# Patient Record
Sex: MALE | Race: WHITE | NOT HISPANIC OR LATINO | Employment: OTHER | URBAN - METROPOLITAN AREA
[De-identification: names, ages, dates, MRNs, and addresses within clinical notes are randomized per-mention and may not be internally consistent; named-entity substitution may affect disease eponyms.]

---

## 2023-04-25 PROBLEM — H00.019 STYE: Status: ACTIVE | Noted: 2023-04-25

## 2023-04-25 PROBLEM — E55.9 MILD VITAMIN D DEFICIENCY: Status: ACTIVE | Noted: 2023-04-25

## 2023-04-25 PROBLEM — L25.9 CONTACT DERMATITIS: Status: ACTIVE | Noted: 2023-04-25

## 2023-04-25 PROBLEM — K21.9 GERD (GASTROESOPHAGEAL REFLUX DISEASE): Status: ACTIVE | Noted: 2023-04-25

## 2023-04-25 PROBLEM — R05.3 CHRONIC COUGH: Status: ACTIVE | Noted: 2023-04-25

## 2023-04-25 PROBLEM — R21 RASH: Status: ACTIVE | Noted: 2023-04-25

## 2023-04-25 PROBLEM — I10 HYPERTENSION: Status: ACTIVE | Noted: 2023-04-25

## 2023-04-25 PROBLEM — L23.7 ALLERGIC CONTACT DERMATITIS DUE TO PLANTS, EXCEPT FOOD: Status: ACTIVE | Noted: 2023-04-25

## 2023-04-25 PROBLEM — M16.12 ARTHRITIS OF LEFT HIP: Status: ACTIVE | Noted: 2023-04-25

## 2023-04-25 PROBLEM — E78.5 HYPERLIPIDEMIA: Status: ACTIVE | Noted: 2023-04-25

## 2023-04-25 RX ORDER — ASPIRIN 81 MG/1
TABLET ORAL
COMMUNITY
End: 2024-04-10 | Stop reason: ALTCHOICE

## 2023-04-25 RX ORDER — MELOXICAM 15 MG/1
1 TABLET ORAL DAILY
COMMUNITY
End: 2023-04-26 | Stop reason: ALTCHOICE

## 2023-04-25 RX ORDER — MULTIVITAMIN
TABLET ORAL
COMMUNITY
Start: 2021-11-12

## 2023-04-25 RX ORDER — ASCORBIC ACID 500 MG
TABLET,CHEWABLE ORAL
COMMUNITY
Start: 2021-11-12

## 2023-04-25 RX ORDER — AMLODIPINE BESYLATE 5 MG/1
1 TABLET ORAL DAILY
COMMUNITY
Start: 2020-02-03 | End: 2023-04-26 | Stop reason: ALTCHOICE

## 2023-04-25 RX ORDER — ENALAPRIL MALEATE 10 MG/1
10 TABLET ORAL
COMMUNITY
End: 2023-04-26 | Stop reason: ALTCHOICE

## 2023-04-25 RX ORDER — LOVASTATIN 40 MG/1
TABLET ORAL
COMMUNITY
Start: 2017-04-18 | End: 2023-11-20 | Stop reason: SDUPTHER

## 2023-04-26 ENCOUNTER — OFFICE VISIT (OUTPATIENT)
Dept: PRIMARY CARE | Facility: CLINIC | Age: 68
End: 2023-04-26
Payer: MEDICARE

## 2023-04-26 VITALS
SYSTOLIC BLOOD PRESSURE: 150 MMHG | WEIGHT: 269 LBS | HEART RATE: 86 BPM | HEIGHT: 70 IN | RESPIRATION RATE: 14 BRPM | BODY MASS INDEX: 38.51 KG/M2 | DIASTOLIC BLOOD PRESSURE: 84 MMHG | OXYGEN SATURATION: 96 %

## 2023-04-26 DIAGNOSIS — I10 PRIMARY HYPERTENSION: Primary | ICD-10-CM

## 2023-04-26 DIAGNOSIS — E66.09 CLASS 2 OBESITY DUE TO EXCESS CALORIES WITHOUT SERIOUS COMORBIDITY WITH BODY MASS INDEX (BMI) OF 38.0 TO 38.9 IN ADULT: ICD-10-CM

## 2023-04-26 DIAGNOSIS — I12.9 NEPHROPATHY HYPERTENSIVE: ICD-10-CM

## 2023-04-26 PROBLEM — E66.812 CLASS 2 OBESITY DUE TO EXCESS CALORIES WITH BODY MASS INDEX (BMI) OF 38.0 TO 38.9 IN ADULT: Status: ACTIVE | Noted: 2023-04-26

## 2023-04-26 PROCEDURE — 3008F BODY MASS INDEX DOCD: CPT | Performed by: INTERNAL MEDICINE

## 2023-04-26 PROCEDURE — 3077F SYST BP >= 140 MM HG: CPT | Performed by: INTERNAL MEDICINE

## 2023-04-26 PROCEDURE — 1159F MED LIST DOCD IN RCRD: CPT | Performed by: INTERNAL MEDICINE

## 2023-04-26 PROCEDURE — 1036F TOBACCO NON-USER: CPT | Performed by: INTERNAL MEDICINE

## 2023-04-26 PROCEDURE — 1157F ADVNC CARE PLAN IN RCRD: CPT | Performed by: INTERNAL MEDICINE

## 2023-04-26 PROCEDURE — 3079F DIAST BP 80-89 MM HG: CPT | Performed by: INTERNAL MEDICINE

## 2023-04-26 PROCEDURE — 99214 OFFICE O/P EST MOD 30 MIN: CPT | Performed by: INTERNAL MEDICINE

## 2023-04-26 RX ORDER — LISINOPRIL 20 MG/1
20 TABLET ORAL DAILY
Qty: 30 TABLET | Refills: 11 | Status: SHIPPED | OUTPATIENT
Start: 2023-04-26 | End: 2023-05-23

## 2023-04-26 ASSESSMENT — PATIENT HEALTH QUESTIONNAIRE - PHQ9
2. FEELING DOWN, DEPRESSED OR HOPELESS: NOT AT ALL
SUM OF ALL RESPONSES TO PHQ9 QUESTIONS 1 AND 2: 0
1. LITTLE INTEREST OR PLEASURE IN DOING THINGS: NOT AT ALL

## 2023-04-26 ASSESSMENT — ENCOUNTER SYMPTOMS
FEVER: 0
NAUSEA: 0
CONSTIPATION: 0
MYALGIAS: 0
ROS GI COMMENTS: PER HPI
CHILLS: 0
SHORTNESS OF BREATH: 0
COUGH: 0
VOMITING: 0
DIAPHORESIS: 0
JOINT SWELLING: 0
DIARRHEA: 0

## 2023-04-26 NOTE — PROGRESS NOTES
"Subjective   Chris Wen Jr. is a 67 y.o. male who presents for follow up to discuss change in BP med causing protien in urine   New concern rectal stool leakage colonoscopy kian 6/20   Having a hard time loosing wt     HPI   Here for bp med adjustment    HAVING SOME RECTAL SEEPAGE PAST MONTH OR SO    NOT ALWAYS BUT BOTHERSOME    PLANNING COLONOSCOPY 6/20/22    NO PAIN,MAYBE SOME ITCHING    HAS HEMORRHOIDS SINCE OUT OF THE [employer]    OCCASIONALLY SOME MILD BLEEDING    NO WEAKNESS OR NUMBNESS IN SADDLE AREA, NO TRAUMA OR BACK INJURIES.    DIFFICULTY LOSING WEIGHT, DOESN'T WATCH DIET, HAS ONE DRINK NIGHTLY      Review of Systems   Constitutional:  Negative for chills, diaphoresis and fever.   Respiratory:  Negative for cough and shortness of breath.    Cardiovascular:  Negative for chest pain and leg swelling.   Gastrointestinal:  Negative for constipation, diarrhea, nausea and vomiting.        Per hpi     Musculoskeletal:  Negative for joint swelling and myalgias.       Objective   /84   Pulse 86   Resp 14   Ht 1.778 m (5' 10\")   Wt 122 kg (269 lb)   SpO2 96%   BMI 38.60 kg/m²     Physical Exam  Vitals reviewed.   Constitutional:       General: He is not in acute distress.     Appearance: He is obese. He is not ill-appearing.   Cardiovascular:      Rate and Rhythm: Normal rate and regular rhythm.      Pulses: Normal pulses.      Heart sounds:      No gallop.   Pulmonary:      Breath sounds: Normal breath sounds. No wheezing, rhonchi or rales.   Abdominal:      General: Abdomen is flat. Bowel sounds are normal.      Palpations: Abdomen is soft.      Tenderness: There is no guarding or rebound.   Musculoskeletal:      Right lower leg: No edema.      Left lower leg: No edema.         Assessment/Plan   Problem List Items Addressed This Visit          Circulatory    Hypertension - Primary    Relevant Medications    lisinopril (PriniviL) 20 mg tablet    Other Relevant Orders    Basic Metabolic Panel    " Follow Up In [facility] - PCP       Genitourinary    Nephropathy hypertensive       Endocrine/Metabolic    Class 2 obesity due to excess calories with body mass index (BMI) of 38.0 to 38.9 in adult     Patient Instructions    WILL SWITCH YOU TO LISINOPRIL FROM AMLODIPINE FOR PURPOSES OF CONTROLLING THE VERY MINOR PROTEIN IN THE URINE    2.  WILL RECHECK BLOOD TEST FOR KIDNEY FUNCTION A WEEK AFTER YOU HAVE BEEN ON NEW MED    3.  THE RECTAL LEAKAGE  ISSUE IS MOST LIKELY DUE TO HEMORRHOIDS. PLEASE MAKE SURE THE GI DOCTOR KNOWS ABOUT THIS ISSUE BEFORE YOUR COLONOSCOPY SO THAT THEY CAN BE SURE AND LOOK AT SIZE AND SCOPE OF THE HEMORRHOIDS    4.  IF A PROCTO-RECTAL SURGEON IS NEEDED BASED UPON WHAT THE GI DOCTOR SEES, THEN I CAN REFER YOU TO ONE    5.  WEIGHT LOSS MEDS THAT ARE STIMULANT IN NATURE ARE NOT GOOD TO START WHEN CHANGING BP MED, AND NEWER WEIGHT LOSS MEDICATIONS ARE COMING ALONG THAT ARE ACTUALLY QUITE EFFECTIVE.  WILL DISCUSS ONGOING IN FOLLOW UP 3 MONTHS BP CHECK.  WE DISCUSSED MEDITERRANEAN DIET AND ALCOHOL MODERATION TO ASSIST YOU WITH WEIGHT LOSS GOALS

## 2023-04-26 NOTE — PATIENT INSTRUCTIONS
WILL SWITCH YOU TO LISINOPRIL FROM AMLODIPINE FOR PURPOSES OF CONTROLLING THE VERY MINOR PROTEIN IN THE URINE    2.  WILL RECHECK BLOOD TEST FOR KIDNEY FUNCTION A WEEK AFTER YOU HAVE BEEN ON NEW MED    3.  THE RECTAL LEAKAGE  ISSUE IS MOST LIKELY DUE TO HEMORRHOIDS. PLEASE MAKE SURE THE GI DOCTOR KNOWS ABOUT THIS ISSUE BEFORE YOUR COLONOSCOPY SO THAT THEY CAN BE SURE AND LOOK AT SIZE AND SCOPE OF THE HEMORRHOIDS    4.  IF A PROCTO-RECTAL SURGEON IS NEEDED BASED UPON WHAT THE GI DOCTOR SEES, THEN I CAN REFER YOU TO ONE    5.  WEIGHT LOSS MEDS THAT ARE STIMULANT IN NATURE ARE NOT GOOD TO START WHEN CHANGING BP MED, AND NEWER WEIGHT LOSS MEDICATIONS ARE COMING ALONG THAT ARE ACTUALLY QUITE EFFECTIVE.  WILL DISCUSS ONGOING IN FOLLOW UP 3 MONTHS BP CHECK.  WE DISCUSSED MEDITERRANEAN DIET AND ALCOHOL MODERATION TO ASSIST YOU WITH WEIGHT LOSS GOALS

## 2023-05-19 DIAGNOSIS — I10 PRIMARY HYPERTENSION: ICD-10-CM

## 2023-05-22 ENCOUNTER — LAB (OUTPATIENT)
Dept: LAB | Facility: LAB | Age: 68
End: 2023-05-22
Payer: MEDICARE

## 2023-05-22 DIAGNOSIS — I10 PRIMARY HYPERTENSION: ICD-10-CM

## 2023-05-22 LAB
ANION GAP IN SER/PLAS: 16 MMOL/L (ref 10–20)
CALCIUM (MG/DL) IN SER/PLAS: 9 MG/DL (ref 8.6–10.3)
CARBON DIOXIDE, TOTAL (MMOL/L) IN SER/PLAS: 28 MMOL/L (ref 21–32)
CHLORIDE (MMOL/L) IN SER/PLAS: 104 MMOL/L (ref 98–107)
CREATININE (MG/DL) IN SER/PLAS: 1.3 MG/DL (ref 0.5–1.3)
GFR MALE: 59 ML/MIN/1.73M2
GLUCOSE (MG/DL) IN SER/PLAS: 100 MG/DL (ref 74–99)
POTASSIUM (MMOL/L) IN SER/PLAS: 4.9 MMOL/L (ref 3.5–5.3)
SODIUM (MMOL/L) IN SER/PLAS: 143 MMOL/L (ref 136–145)
UREA NITROGEN (MG/DL) IN SER/PLAS: 21 MG/DL (ref 6–23)

## 2023-05-22 PROCEDURE — 80048 BASIC METABOLIC PNL TOTAL CA: CPT

## 2023-05-22 PROCEDURE — 36415 COLL VENOUS BLD VENIPUNCTURE: CPT

## 2023-05-23 DIAGNOSIS — I10 PRIMARY HYPERTENSION: Primary | ICD-10-CM

## 2023-05-23 RX ORDER — LISINOPRIL 20 MG/1
TABLET ORAL
Qty: 30 TABLET | Refills: 11 | Status: SHIPPED | OUTPATIENT
Start: 2023-05-23 | End: 2023-05-24 | Stop reason: SDUPTHER

## 2023-05-24 DIAGNOSIS — I10 PRIMARY HYPERTENSION: ICD-10-CM

## 2023-05-25 RX ORDER — LISINOPRIL 20 MG/1
20 TABLET ORAL DAILY
Qty: 90 TABLET | Refills: 3 | Status: SHIPPED | OUTPATIENT
Start: 2023-05-25 | End: 2023-11-20 | Stop reason: SINTOL

## 2023-06-20 ENCOUNTER — HOSPITAL ENCOUNTER (OUTPATIENT)
Dept: DATA CONVERSION | Facility: HOSPITAL | Age: 68
End: 2023-06-20
Attending: INTERNAL MEDICINE | Admitting: INTERNAL MEDICINE
Payer: MEDICARE

## 2023-06-20 DIAGNOSIS — Z12.11 ENCOUNTER FOR SCREENING FOR MALIGNANT NEOPLASM OF COLON: ICD-10-CM

## 2023-06-20 DIAGNOSIS — I10 ESSENTIAL (PRIMARY) HYPERTENSION: ICD-10-CM

## 2023-06-20 DIAGNOSIS — K57.30 DIVERTICULOSIS OF LARGE INTESTINE WITHOUT PERFORATION OR ABSCESS WITHOUT BLEEDING: ICD-10-CM

## 2023-06-20 DIAGNOSIS — Z86.010 PERSONAL HISTORY OF COLONIC POLYPS: ICD-10-CM

## 2023-06-20 DIAGNOSIS — Z87.891 PERSONAL HISTORY OF NICOTINE DEPENDENCE: ICD-10-CM

## 2023-07-24 ENCOUNTER — APPOINTMENT (OUTPATIENT)
Dept: LAB | Facility: LAB | Age: 68
End: 2023-07-24
Payer: MEDICARE

## 2023-07-24 ENCOUNTER — TELEPHONE (OUTPATIENT)
Dept: PRIMARY CARE | Facility: CLINIC | Age: 68
End: 2023-07-24

## 2023-07-24 ENCOUNTER — LAB (OUTPATIENT)
Dept: LAB | Facility: LAB | Age: 68
End: 2023-07-24
Payer: MEDICARE

## 2023-07-24 DIAGNOSIS — I10 PRIMARY HYPERTENSION: Primary | ICD-10-CM

## 2023-07-24 DIAGNOSIS — I10 PRIMARY HYPERTENSION: ICD-10-CM

## 2023-07-24 LAB
ANION GAP IN SER/PLAS: 16 MMOL/L (ref 10–20)
CALCIUM (MG/DL) IN SER/PLAS: 9.1 MG/DL (ref 8.6–10.3)
CARBON DIOXIDE, TOTAL (MMOL/L) IN SER/PLAS: 22 MMOL/L (ref 21–32)
CHLORIDE (MMOL/L) IN SER/PLAS: 105 MMOL/L (ref 98–107)
CREATININE (MG/DL) IN SER/PLAS: 1.14 MG/DL (ref 0.5–1.3)
GFR MALE: 69 ML/MIN/1.73M2
GLUCOSE (MG/DL) IN SER/PLAS: 101 MG/DL (ref 74–99)
POTASSIUM (MMOL/L) IN SER/PLAS: 4.1 MMOL/L (ref 3.5–5.3)
SODIUM (MMOL/L) IN SER/PLAS: 139 MMOL/L (ref 136–145)
UREA NITROGEN (MG/DL) IN SER/PLAS: 18 MG/DL (ref 6–23)

## 2023-07-24 PROCEDURE — 36415 COLL VENOUS BLD VENIPUNCTURE: CPT

## 2023-07-24 PROCEDURE — 80048 BASIC METABOLIC PNL TOTAL CA: CPT

## 2023-07-24 NOTE — TELEPHONE ENCOUNTER
Pt had BMP done today a month early he says he called last week and was told to do before his appt would be a month sooner then recommended pt doesn't not want to come back next month was not happy with having to be stuck 3x today

## 2023-07-27 ENCOUNTER — OFFICE VISIT (OUTPATIENT)
Dept: PRIMARY CARE | Facility: CLINIC | Age: 68
End: 2023-07-27
Payer: MEDICARE

## 2023-07-27 VITALS
OXYGEN SATURATION: 96 % | RESPIRATION RATE: 14 BRPM | SYSTOLIC BLOOD PRESSURE: 130 MMHG | HEIGHT: 70 IN | DIASTOLIC BLOOD PRESSURE: 80 MMHG | BODY MASS INDEX: 38.37 KG/M2 | HEART RATE: 77 BPM | WEIGHT: 268 LBS

## 2023-07-27 DIAGNOSIS — H90.0 CONDUCTIVE HEARING LOSS, BILATERAL: ICD-10-CM

## 2023-07-27 DIAGNOSIS — I10 PRIMARY HYPERTENSION: ICD-10-CM

## 2023-07-27 DIAGNOSIS — H61.23 CERUMEN DEBRIS ON TYMPANIC MEMBRANE OF BOTH EARS: Primary | ICD-10-CM

## 2023-07-27 PROCEDURE — 99213 OFFICE O/P EST LOW 20 MIN: CPT | Performed by: INTERNAL MEDICINE

## 2023-07-27 PROCEDURE — 1036F TOBACCO NON-USER: CPT | Performed by: INTERNAL MEDICINE

## 2023-07-27 PROCEDURE — 1157F ADVNC CARE PLAN IN RCRD: CPT | Performed by: INTERNAL MEDICINE

## 2023-07-27 PROCEDURE — 3075F SYST BP GE 130 - 139MM HG: CPT | Performed by: INTERNAL MEDICINE

## 2023-07-27 PROCEDURE — 3008F BODY MASS INDEX DOCD: CPT | Performed by: INTERNAL MEDICINE

## 2023-07-27 PROCEDURE — 1159F MED LIST DOCD IN RCRD: CPT | Performed by: INTERNAL MEDICINE

## 2023-07-27 PROCEDURE — 3079F DIAST BP 80-89 MM HG: CPT | Performed by: INTERNAL MEDICINE

## 2023-07-27 ASSESSMENT — PATIENT HEALTH QUESTIONNAIRE - PHQ9
SUM OF ALL RESPONSES TO PHQ9 QUESTIONS 1 AND 2: 0
1. LITTLE INTEREST OR PLEASURE IN DOING THINGS: NOT AT ALL
2. FEELING DOWN, DEPRESSED OR HOPELESS: NOT AT ALL

## 2023-07-27 NOTE — PROGRESS NOTES
"Subjective   Chris Wen Jr. is a 67 y.o. male who presents for FOLLOW UP    C/O INCREASED HEARING LOSS WOULD LIKE REFERRAL   HPI   STARTING TO SENSE A TINNITIS IN LEFT EAR PERHAPS    HEARING GETTING WORSE    NO RECENT HEARING EVALUATION    WAS IN       Review of Systems   Constitutional:  Negative for chills, diaphoresis and fever.   HENT:  Positive for hearing loss.    Respiratory:  Negative for cough and shortness of breath.    Cardiovascular:  Negative for chest pain and leg swelling.   Gastrointestinal:  Negative for constipation, diarrhea, nausea and vomiting.   Musculoskeletal:  Negative for joint swelling and myalgias.       Objective   /80   Pulse 77   Resp 14   Ht 1.778 m (5' 10\")   Wt 122 kg (268 lb)   SpO2 96%   BMI 38.45 kg/m²     Physical Exam  Vitals reviewed.   Constitutional:       General: He is not in acute distress.     Appearance: He is obese. He is not ill-appearing.   HENT:      Right Ear: Tympanic membrane, ear canal and external ear normal. There is no impacted cerumen.      Left Ear: Tympanic membrane, ear canal and external ear normal. There is no impacted cerumen.      Ears:      Comments: Webers lateralize to the right, rinne normal bilat, scant cerumen in external canal  Cardiovascular:      Rate and Rhythm: Normal rate and regular rhythm.      Pulses: Normal pulses.      Heart sounds:      No gallop.   Pulmonary:      Breath sounds: Normal breath sounds. No wheezing, rhonchi or rales.   Abdominal:      General: Abdomen is flat. Bowel sounds are normal.      Palpations: Abdomen is soft.      Tenderness: There is no guarding or rebound.   Musculoskeletal:      Right lower leg: No edema.      Left lower leg: No edema.         Assessment/Plan   Problem List Items Addressed This Visit       Hypertension     Other Visit Diagnoses       Cerumen debris on tympanic membrane of both ears    -  Primary    Relevant Medications    carbamide peroxide (Debrox) 6.5 % otic " solution    Conductive hearing loss, bilateral        Relevant Orders    Referral to Audiology          Patient Instructions    REFERRAL TO AUDIOLOGY FOR HEARING CHECK AND OPINION ON NEED FOR AMLPIFICATION    2,  DEBROX DROPS FOR 4 DAYS THE WEEK PRIOR TO YOUR APPOINTMENT WITH AUDIOLOGY TO CLEAR OUT ANY RESIDUAL EAR WAX    3.  PLEASE CALL IF REFILLS NEEDED.    4.  FOLLOW UP ROUTINELY OR AS NEEDED

## 2023-07-27 NOTE — PATIENT INSTRUCTIONS
REFERRAL TO AUDIOLOGY FOR HEARING CHECK AND OPINION ON NEED FOR AMLPIFICATION    2,  DEBROX DROPS FOR 4 DAYS THE WEEK PRIOR TO YOUR APPOINTMENT WITH AUDIOLOGY TO CLEAR OUT ANY RESIDUAL EAR WAX    3.  PLEASE CALL IF REFILLS NEEDED.    4.  FOLLOW UP ROUTINELY OR AS NEEDED

## 2023-07-31 ASSESSMENT — ENCOUNTER SYMPTOMS
DIARRHEA: 0
CHILLS: 0
SHORTNESS OF BREATH: 0
JOINT SWELLING: 0
COUGH: 0
VOMITING: 0
DIAPHORESIS: 0
MYALGIAS: 0
CONSTIPATION: 0
NAUSEA: 0
FEVER: 0

## 2023-09-07 VITALS — BODY MASS INDEX: 35.41 KG/M2 | HEIGHT: 70 IN | WEIGHT: 247.36 LBS

## 2023-11-20 ENCOUNTER — OFFICE VISIT (OUTPATIENT)
Dept: PRIMARY CARE | Facility: CLINIC | Age: 68
End: 2023-11-20
Payer: MEDICARE

## 2023-11-20 ENCOUNTER — TELEPHONE (OUTPATIENT)
Dept: PRIMARY CARE | Facility: CLINIC | Age: 68
End: 2023-11-20

## 2023-11-20 VITALS
HEART RATE: 87 BPM | BODY MASS INDEX: 40.09 KG/M2 | RESPIRATION RATE: 14 BRPM | DIASTOLIC BLOOD PRESSURE: 92 MMHG | SYSTOLIC BLOOD PRESSURE: 130 MMHG | OXYGEN SATURATION: 96 % | HEIGHT: 70 IN | WEIGHT: 280 LBS

## 2023-11-20 DIAGNOSIS — I10 PRIMARY HYPERTENSION: ICD-10-CM

## 2023-11-20 DIAGNOSIS — Z00.00 MEDICARE ANNUAL WELLNESS VISIT, SUBSEQUENT: Primary | ICD-10-CM

## 2023-11-20 DIAGNOSIS — E55.9 VITAMIN D DEFICIENCY: ICD-10-CM

## 2023-11-20 DIAGNOSIS — E66.01 CLASS 3 SEVERE OBESITY DUE TO EXCESS CALORIES WITHOUT SERIOUS COMORBIDITY WITH BODY MASS INDEX (BMI) OF 40.0 TO 44.9 IN ADULT (MULTI): ICD-10-CM

## 2023-11-20 DIAGNOSIS — E78.5 HYPERLIPIDEMIA, UNSPECIFIED HYPERLIPIDEMIA TYPE: ICD-10-CM

## 2023-11-20 DIAGNOSIS — R35.1 NOCTURIA: ICD-10-CM

## 2023-11-20 DIAGNOSIS — Z00.00 ROUTINE GENERAL MEDICAL EXAMINATION AT HEALTH CARE FACILITY: ICD-10-CM

## 2023-11-20 PROBLEM — E66.813 CLASS 3 SEVERE OBESITY WITH BODY MASS INDEX (BMI) OF 40.0 TO 44.9 IN ADULT: Status: ACTIVE | Noted: 2023-11-20

## 2023-11-20 PROCEDURE — 3008F BODY MASS INDEX DOCD: CPT | Performed by: INTERNAL MEDICINE

## 2023-11-20 PROCEDURE — 1170F FXNL STATUS ASSESSED: CPT | Performed by: INTERNAL MEDICINE

## 2023-11-20 PROCEDURE — 3075F SYST BP GE 130 - 139MM HG: CPT | Performed by: INTERNAL MEDICINE

## 2023-11-20 PROCEDURE — 99212 OFFICE O/P EST SF 10 MIN: CPT | Performed by: INTERNAL MEDICINE

## 2023-11-20 PROCEDURE — 1159F MED LIST DOCD IN RCRD: CPT | Performed by: INTERNAL MEDICINE

## 2023-11-20 PROCEDURE — G0439 PPPS, SUBSEQ VISIT: HCPCS | Performed by: INTERNAL MEDICINE

## 2023-11-20 PROCEDURE — 1036F TOBACCO NON-USER: CPT | Performed by: INTERNAL MEDICINE

## 2023-11-20 PROCEDURE — 3080F DIAST BP >= 90 MM HG: CPT | Performed by: INTERNAL MEDICINE

## 2023-11-20 RX ORDER — LOVASTATIN 40 MG/1
40 TABLET ORAL
Qty: 90 TABLET | Refills: 3 | Status: SHIPPED | OUTPATIENT
Start: 2023-11-20 | End: 2024-04-10 | Stop reason: ALTCHOICE

## 2023-11-20 RX ORDER — LOSARTAN POTASSIUM 50 MG/1
50 TABLET ORAL DAILY
Qty: 90 TABLET | Refills: 3 | Status: SHIPPED | OUTPATIENT
Start: 2023-11-20 | End: 2023-12-11 | Stop reason: SDUPTHER

## 2023-11-20 ASSESSMENT — ACTIVITIES OF DAILY LIVING (ADL)
DRESSING: INDEPENDENT
MANAGING_FINANCES: INDEPENDENT
GROCERY_SHOPPING: INDEPENDENT
DOING_HOUSEWORK: INDEPENDENT
TAKING_MEDICATION: INDEPENDENT
BATHING: INDEPENDENT

## 2023-11-20 ASSESSMENT — ENCOUNTER SYMPTOMS
MYALGIAS: 0
CONSTIPATION: 0
FEVER: 0
SHORTNESS OF BREATH: 0
NAUSEA: 0
COUGH: 0
DIAPHORESIS: 0
JOINT SWELLING: 0
DIARRHEA: 0
CHILLS: 0
VOMITING: 0

## 2023-11-20 NOTE — PATIENT INSTRUCTIONS
RECOMMEND ONGOING REGULAR EXERCISE, AND CARBOHYDRATE RESTRICTION TO HELP LOSE WEIGHT    2.  FASTING LABS ARE ORDERED FOR AFTER 12/29/2023    3.  PLEASE VERIFY WITH YOUR DAUGHTER THAT YOU HAVE RECEIVED THE PNEUMONIA IMMUNIZATION    4.  REGULAR EYE EXAMS YEARLY FOR RETINA AND GLAUCOMA CHECK    5.  PLEASE CALL IF REFILLS ARE NEEDED    6.  CHANGE LISINOPRIL TO LOSARTAN 50 MG DAILY IN AN EFFORT TO MITIGATE CHRONIC COUGH    7.  YEARLY FOLLOW UP OR AS NEEDED    8.  PLEASE KEEP EYE ON BP AFTER MED CHANGE AND CALL.COME IN IF SUSTAINED >140/90    9.  WE DISCUSSED THAT WHITE NOISE CAN HELP RELIEVE SOME OF THE IRRITATION OF TINNITIS

## 2023-11-20 NOTE — TELEPHONE ENCOUNTER
JERICHO QUINONEZ,  I FORGOT TO DISCUSS SNORING WITH MR. FENG  IF HIS SPOUSE SAYS HE STOPS BREATHING OR IS GASPING, AND IF HE'S CHRONICALLY FATIGUED, THEN I COULD JUSTIFY CHECKING A HOME SLEEP TEST TO EXCLUDE SLEEP APNEA, THX, JUST LET ME KNOW

## 2023-11-20 NOTE — PROGRESS NOTES
"Subjective   Reason for Visit: Chris Wen Jr. is an 68 y.o. male here for a Medicare Wellness visit.    DEFERS FLU SHOT    RF NEEDED ON LOVASTATIN   PICKING UP HEARING AIDES TODAY    HAS HAD A COUGH X 5 MONTHS   ALSO HAS WHEEZING HAS GAINED WT   PT SNORES   PT SAYS HIS LAST HIP WAS DONE 4YRS AGO CALLED FOR ATB WAS TOLD GUIDE LINES CHANGED ONLY NEEDED FOR A YR PT WANTS YOUR OPINION IF HE SHOULD STILL TAKE.     Past Medical, Surgical, and Family History reviewed and updated in chart.    Reviewed all medications by prescribing practitioner or clinical pharmacist (such as prescriptions, OTCs, herbal therapies and supplements) and documented in the medical record.    HPI  GOT PNEUMONIA SHOT 2 YEARS AGO.    GOING TO GET FLU SHOT AT DAUGHTER'S PHARMACY    NEVER SMOKER    PLAY TENNIS, PICKLE BALL TWICE PER WEEK, GOT OUT OF WALKING HABIT    RETIRED  AND [employer]  SAW ENT FOR LEFT TINNITIS, NO CAUSE FOUND, HEATING AIDS PRESCRIBED    TINNITIS ANNOYING WHEN READING OR QUIET          Patient Care Team:  Janak Nelson MD as PCP - General (Internal Medicine)  Janak Nelson MD as PCP - tna Medicare Advantage PCP     Review of Systems   Constitutional:  Negative for chills, diaphoresis and fever.   HENT:  Positive for hearing loss and tinnitus.    Respiratory:  Negative for cough and shortness of breath.    Cardiovascular:  Negative for chest pain and leg swelling.   Gastrointestinal:  Negative for constipation, diarrhea, nausea and vomiting.   Musculoskeletal:  Negative for joint swelling and myalgias.       Objective   Vitals:  BP (!) 130/92   Pulse 87   Resp 14   Ht 1.778 m (5' 10\")   Wt 127 kg (280 lb)   SpO2 96%   BMI 40.18 kg/m²       Physical Exam  Vitals reviewed.   Constitutional:       General: He is not in acute distress.     Appearance: He is obese. He is not ill-appearing.   HENT:      Right Ear: Tympanic membrane, ear canal and external ear normal. There is no impacted " cerumen.      Left Ear: Tympanic membrane, ear canal and external ear normal. There is no impacted cerumen.      Ears:      Comments: scant cerumen in external canal  Cardiovascular:      Rate and Rhythm: Normal rate and regular rhythm.      Pulses: Normal pulses.      Heart sounds:      No gallop.   Pulmonary:      Breath sounds: Normal breath sounds. No wheezing, rhonchi or rales.   Abdominal:      General: Abdomen is flat. Bowel sounds are normal.      Palpations: Abdomen is soft.      Tenderness: There is no guarding or rebound.   Musculoskeletal:      Right lower leg: No edema.      Left lower leg: No edema.         Assessment/Plan   Problem List Items Addressed This Visit       Hyperlipidemia    Relevant Medications    lovastatin (Mevacor) 40 mg tablet    Other Relevant Orders    Vitamin B12    Lipid Panel    TSH with reflex to Free T4 if abnormal    Comprehensive Metabolic Panel    CBC    Hypertension    Relevant Medications    losartan (Cozaar) 50 mg tablet    Other Relevant Orders    Protein, Urine Random    Medicare annual wellness visit, subsequent - Primary    Class 3 severe obesity with body mass index (BMI) of 40.0 to 44.9 in adult (CMS/[facility])     Other Visit Diagnoses       Nocturia        Relevant Orders    Prostate Specific Antigen, Screen    Vitamin D deficiency        Relevant Orders    Vitamin D 25-Hydroxy,Total (for eval of Vitamin D levels)    Routine general medical examination at health care facility              Patient Instructions    RECOMMEND ONGOING REGULAR EXERCISE, AND CARBOHYDRATE RESTRICTION TO HELP LOSE WEIGHT    2.  FASTING LABS ARE ORDERED FOR AFTER 12/29/2023    3.  PLEASE VERIFY WITH YOUR DAUGHTER THAT YOU HAVE RECEIVED THE PNEUMONIA IMMUNIZATION    4.  REGULAR EYE EXAMS YEARLY FOR RETINA AND GLAUCOMA CHECK    5.  PLEASE CALL IF REFILLS ARE NEEDED    6.  CHANGE LISINOPRIL TO LOSARTAN 50 MG DAILY IN AN EFFORT TO MITIGATE CHRONIC COUGH    7.  YEARLY FOLLOW UP OR AS NEEDED    8.   PLEASE KEEP EYE ON BP AFTER MED CHANGE AND CALL.COME IN IF SUSTAINED >140/90    9.  WE DISCUSSED THAT WHITE NOISE CAN HELP RELIEVE SOME OF THE IRRITATION OF TINNITIS

## 2023-12-11 ENCOUNTER — TELEPHONE (OUTPATIENT)
Dept: PRIMARY CARE | Facility: CLINIC | Age: 68
End: 2023-12-11
Payer: MEDICARE

## 2023-12-11 DIAGNOSIS — I10 PRIMARY HYPERTENSION: ICD-10-CM

## 2023-12-11 RX ORDER — LOSARTAN POTASSIUM 100 MG/1
100 TABLET ORAL DAILY
Qty: 90 TABLET | Refills: 3 | Status: SHIPPED | OUTPATIENT
Start: 2023-12-11 | End: 2024-12-10

## 2023-12-11 NOTE — TELEPHONE ENCOUNTER
Pt wanted you to know that since you switched BP meds from Lisinipril to Losartan he has been monitoring his BP.  It has been an average of 158/95.  This morning it was 172/105.    He wanted you to be aware and if you want to make any adjustments?    Please advise.

## 2023-12-26 ENCOUNTER — PHARMACY VISIT (OUTPATIENT)
Dept: PHARMACY | Facility: CLINIC | Age: 68
End: 2023-12-26

## 2023-12-26 ENCOUNTER — TELEPHONE (OUTPATIENT)
Dept: PRIMARY CARE | Facility: CLINIC | Age: 68
End: 2023-12-26
Payer: MEDICARE

## 2023-12-26 DIAGNOSIS — U07.1 COVID-19 VIRUS INFECTION: Primary | ICD-10-CM

## 2023-12-26 PROCEDURE — RXMED WILLOW AMBULATORY MEDICATION CHARGE

## 2023-12-26 NOTE — TELEPHONE ENCOUNTER
Pt was diagnosed Covid Positive on 12/25 and is requesting Paxlovid Rx to be sent to [facility] in [place].

## 2024-03-11 ENCOUNTER — LAB (OUTPATIENT)
Dept: LAB | Facility: LAB | Age: 69
End: 2024-03-11
Payer: MEDICARE

## 2024-03-11 DIAGNOSIS — I10 PRIMARY HYPERTENSION: ICD-10-CM

## 2024-03-11 DIAGNOSIS — R35.1 NOCTURIA: ICD-10-CM

## 2024-03-11 DIAGNOSIS — E55.9 VITAMIN D DEFICIENCY: ICD-10-CM

## 2024-03-11 DIAGNOSIS — E78.5 HYPERLIPIDEMIA, UNSPECIFIED HYPERLIPIDEMIA TYPE: ICD-10-CM

## 2024-03-11 LAB
25(OH)D3 SERPL-MCNC: 26 NG/ML (ref 30–100)
ALBUMIN SERPL BCP-MCNC: 4 G/DL (ref 3.4–5)
ALP SERPL-CCNC: 43 U/L (ref 33–136)
ALT SERPL W P-5'-P-CCNC: 50 U/L (ref 10–52)
ANION GAP SERPL CALC-SCNC: 10 MMOL/L (ref 10–20)
AST SERPL W P-5'-P-CCNC: 42 U/L (ref 9–39)
BILIRUB SERPL-MCNC: 0.4 MG/DL (ref 0–1.2)
BUN SERPL-MCNC: 18 MG/DL (ref 6–23)
CALCIUM SERPL-MCNC: 8.5 MG/DL (ref 8.6–10.3)
CHLORIDE SERPL-SCNC: 107 MMOL/L (ref 98–107)
CHOLEST SERPL-MCNC: 152 MG/DL (ref 0–199)
CHOLESTEROL/HDL RATIO: 3.4
CO2 SERPL-SCNC: 28 MMOL/L (ref 21–32)
CREAT SERPL-MCNC: 1 MG/DL (ref 0.5–1.3)
CREAT UR-MCNC: 149.9 MG/DL (ref 20–370)
EGFRCR SERPLBLD CKD-EPI 2021: 81 ML/MIN/1.73M*2
ERYTHROCYTE [DISTWIDTH] IN BLOOD BY AUTOMATED COUNT: 14.3 % (ref 11.5–14.5)
GLUCOSE SERPL-MCNC: 88 MG/DL (ref 74–99)
HCT VFR BLD AUTO: 43.9 % (ref 41–52)
HDLC SERPL-MCNC: 44.9 MG/DL
HGB BLD-MCNC: 14.2 G/DL (ref 13.5–17.5)
LDLC SERPL CALC-MCNC: 62 MG/DL
MCH RBC QN AUTO: 29.9 PG (ref 26–34)
MCHC RBC AUTO-ENTMCNC: 32.3 G/DL (ref 32–36)
MCV RBC AUTO: 92 FL (ref 80–100)
NON HDL CHOLESTEROL: 107 MG/DL (ref 0–149)
NRBC BLD-RTO: 0 /100 WBCS (ref 0–0)
PLATELET # BLD AUTO: 214 X10*3/UL (ref 150–450)
POTASSIUM SERPL-SCNC: 4.4 MMOL/L (ref 3.5–5.3)
PROT SERPL-MCNC: 6.3 G/DL (ref 6.4–8.2)
PROT UR-ACNC: 12 MG/DL (ref 5–25)
PROT/CREAT UR: 0.08 MG/MG CREAT (ref 0–0.17)
PSA SERPL-MCNC: 0.37 NG/ML
RBC # BLD AUTO: 4.75 X10*6/UL (ref 4.5–5.9)
SODIUM SERPL-SCNC: 141 MMOL/L (ref 136–145)
TRIGL SERPL-MCNC: 228 MG/DL (ref 0–149)
TSH SERPL-ACNC: 3.04 MIU/L (ref 0.44–3.98)
VIT B12 SERPL-MCNC: 380 PG/ML (ref 211–911)
VLDL: 46 MG/DL (ref 0–40)
WBC # BLD AUTO: 7 X10*3/UL (ref 4.4–11.3)

## 2024-03-11 PROCEDURE — 80061 LIPID PANEL: CPT

## 2024-03-11 PROCEDURE — 84153 ASSAY OF PSA TOTAL: CPT

## 2024-03-11 PROCEDURE — 85027 COMPLETE CBC AUTOMATED: CPT

## 2024-03-11 PROCEDURE — 82570 ASSAY OF URINE CREATININE: CPT

## 2024-03-11 PROCEDURE — 80053 COMPREHEN METABOLIC PANEL: CPT

## 2024-03-11 PROCEDURE — 84156 ASSAY OF PROTEIN URINE: CPT

## 2024-03-11 PROCEDURE — 36415 COLL VENOUS BLD VENIPUNCTURE: CPT

## 2024-03-11 PROCEDURE — 84443 ASSAY THYROID STIM HORMONE: CPT

## 2024-03-11 PROCEDURE — 82607 VITAMIN B-12: CPT

## 2024-03-11 PROCEDURE — 82306 VITAMIN D 25 HYDROXY: CPT

## 2024-04-10 ENCOUNTER — OFFICE VISIT (OUTPATIENT)
Dept: PRIMARY CARE | Facility: CLINIC | Age: 69
End: 2024-04-10
Payer: MEDICARE

## 2024-04-10 VITALS
OXYGEN SATURATION: 95 % | RESPIRATION RATE: 14 BRPM | WEIGHT: 271 LBS | HEART RATE: 82 BPM | DIASTOLIC BLOOD PRESSURE: 90 MMHG | SYSTOLIC BLOOD PRESSURE: 132 MMHG | BODY MASS INDEX: 38.8 KG/M2 | HEIGHT: 70 IN

## 2024-04-10 DIAGNOSIS — E66.01 CLASS 3 SEVERE OBESITY DUE TO EXCESS CALORIES WITHOUT SERIOUS COMORBIDITY WITH BODY MASS INDEX (BMI) OF 40.0 TO 44.9 IN ADULT (MULTI): ICD-10-CM

## 2024-04-10 DIAGNOSIS — I10 PRIMARY HYPERTENSION: ICD-10-CM

## 2024-04-10 DIAGNOSIS — R06.09 DYSPNEA ON EXERTION: Primary | ICD-10-CM

## 2024-04-10 DIAGNOSIS — E78.5 HYPERLIPIDEMIA, UNSPECIFIED HYPERLIPIDEMIA TYPE: ICD-10-CM

## 2024-04-10 PROCEDURE — 99214 OFFICE O/P EST MOD 30 MIN: CPT | Performed by: INTERNAL MEDICINE

## 2024-04-10 PROCEDURE — 3008F BODY MASS INDEX DOCD: CPT | Performed by: INTERNAL MEDICINE

## 2024-04-10 PROCEDURE — 3075F SYST BP GE 130 - 139MM HG: CPT | Performed by: INTERNAL MEDICINE

## 2024-04-10 PROCEDURE — 1157F ADVNC CARE PLAN IN RCRD: CPT | Performed by: INTERNAL MEDICINE

## 2024-04-10 PROCEDURE — 3080F DIAST BP >= 90 MM HG: CPT | Performed by: INTERNAL MEDICINE

## 2024-04-10 PROCEDURE — 1159F MED LIST DOCD IN RCRD: CPT | Performed by: INTERNAL MEDICINE

## 2024-04-10 PROCEDURE — 93000 ELECTROCARDIOGRAM COMPLETE: CPT | Performed by: INTERNAL MEDICINE

## 2024-04-10 PROCEDURE — 1036F TOBACCO NON-USER: CPT | Performed by: INTERNAL MEDICINE

## 2024-04-10 RX ORDER — LOVASTATIN 40 MG/1
40 TABLET ORAL
Start: 2024-04-10 | End: 2025-04-10

## 2024-04-10 ASSESSMENT — ENCOUNTER SYMPTOMS
CHEST TIGHTNESS: 0
JOINT SWELLING: 0
FATIGUE: 1
SHORTNESS OF BREATH: 1
DIAPHORESIS: 0
WHEEZING: 1
FEVER: 0
COUGH: 0
PALPITATIONS: 0
VOMITING: 0
DIARRHEA: 0
CONSTIPATION: 0
CHILLS: 0
NAUSEA: 0
MYALGIAS: 0

## 2024-04-10 NOTE — PROGRESS NOTES
"Subjective   Chris Wen Jr. is a 68 y.o. male who presents for INCREASED FATIGUE SLEEPING A LOT       HPI   SLEEP WELL    CAN SLEEP FOR 7.5 HOURS, THEN 3 HOURS LATER FEEL SLEEPY AND CAN FALL ASLEEP    THIS HAS BEEN CHRONIC    WHAT IS NEW IS THAT GOING UP AND DOWN STAIRS GETTING A LITTLE SHORT OF BREATH AND LIGHT HEADED.  NOT EVERY TIME.  ONLY GOING UPSTAIRS, NOT SO MUCH DOWN STAIRS.    HAVEN'T FALLEN DOWN,     PLAY PICKLE BALL AND TENNIS.  WALK ALOT.  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  HAS LOST 9 POUNDS FROM ALL THE WALKING.    WIFE IS RESP THERAPIST.  DO NATURE HHIKES IN THE COMMUNITY AND WALK GREAT [employer].    NOT HAD CHEST PAIN.  WHEN GETS SOB, NOT GETTING SUPER SWEATY OR NAUSEATED    WHEN SLEEP DON'T SNORE, DON'T TOSS AND TURN, SO NO APNEA PER SPOUSE RESP THERAPIST.    DOES WHEEZE A LOT WHEN SHORT OF BREATH.  BUT CAN ALSO WHEEZE WHEN SITTING WATCHING TV.  Review of Systems   Constitutional:  Positive for fatigue. Negative for chills, diaphoresis and fever.   Respiratory:  Positive for shortness of breath and wheezing. Negative for cough and chest tightness.    Cardiovascular:  Negative for chest pain, palpitations and leg swelling.   Gastrointestinal:  Negative for constipation, diarrhea, nausea and vomiting.   Musculoskeletal:  Negative for joint swelling and myalgias.       Objective   /90   Pulse 82   Resp 14   Ht 1.778 m (5' 10\")   Wt 123 kg (271 lb)   SpO2 95%   BMI 38.88 kg/m²     Physical Exam  Vitals reviewed.   Constitutional:       General: He is not in acute distress.     Appearance: He is obese. He is not ill-appearing.   HENT:      Right Ear: Tympanic membrane, ear canal and external ear normal. There is no impacted cerumen.      Left Ear: Tympanic membrane, ear canal and external ear normal. There is no impacted cerumen.   Cardiovascular:      Rate and Rhythm: Normal rate and regular rhythm.      Pulses: Normal pulses.      Heart sounds:      No " gallop.   Pulmonary:      Breath sounds: Normal breath sounds. No wheezing, rhonchi or rales.   Abdominal:      General: Abdomen is flat. Bowel sounds are normal.      Palpations: Abdomen is soft.      Tenderness: There is no guarding or rebound.   Musculoskeletal:      Right lower leg: No edema.      Left lower leg: No edema.         Assessment/Plan   Problem List Items Addressed This Visit       Hyperlipidemia    Relevant Medications    lovastatin (Mevacor) 40 mg tablet    Other Relevant Orders    Referral to Cardiology    Hypertension    Relevant Orders    Referral to Cardiology    Class 3 severe obesity with body mass index (BMI) of 40.0 to 44.9 in adult (CMS/[facility])     WEIGHT LOSS ENCOURAGED         Dyspnea on exertion - Primary    Relevant Orders    ECG 12 lead (Clinic Performed)    Referral to Cardiology     Patient Instructions    IN THE CASE OF NEW ONSET CONSISTENT SHORTNESS OF BREATH WITH EXERTION ASSOCIATED WITH FATIGUE IN A MAN YOUR AGE WITH HYPERTENSION AND CHOLESTEROL, IT WOULD BE BEST TO HAVE YOU SEE A CARDIOLOGIST TO EXCLUDE ANY POTENTIAL CARDIAC CONTRIBUTION TO THE SYMPTOM.    2.  IN THE MEANTIME, CONTINUE CURRENT MEDS AND ADD BACK BABY ASPIRIN A DAY UNTIL THE SITUATION BECOMES CLARIFIED BY THE CARDIOLOGIST.    3.  IF HEART TESTS OUT TO BE FINE, THEN I WILL WORK ON THE NEXT MOST LIKELY CAUSE OF YOUR SYMPTOM WHICH WOULD BE PULMONARY - SUCH AS A NEW ASTHMATIC SITUATION PERHAPS.      4.  IN THE MEANTIME UNTIL YOU SEE CARDIOLOGY,  IF YOU SHOULD EXPERIENCE CHEST DISCOMFORT OR SHORTNESS OF BREATH THAT DOES NOT RESOLVE WITH REST, THEN YOU SHOULD BE SEEN URGENTLY IN THE ER TO EXCLUDE CARDIAC EVENT    5.  FOLLOW UP AS NEEDED AFTER CARDIOLOGY.    6.  EKG HERE IS UNREMARKABLE AND UNCHANGED FROM THE ONE DONE 3 YEARS AGO.  THIS IS REASSURING BUT STILL REQUIRES CARDIAC TESTING TO EXCLUDE ANY POTENTIAL ISSUES.

## 2024-04-10 NOTE — PATIENT INSTRUCTIONS
IN THE CASE OF NEW ONSET CONSISTENT SHORTNESS OF BREATH WITH EXERTION ASSOCIATED WITH FATIGUE IN A MAN YOUR AGE WITH HYPERTENSION AND CHOLESTEROL, IT WOULD BE BEST TO HAVE YOU SEE A CARDIOLOGIST TO EXCLUDE ANY POTENTIAL CARDIAC CONTRIBUTION TO THE SYMPTOM.    2.  IN THE MEANTIME, CONTINUE CURRENT MEDS AND ADD BACK BABY ASPIRIN A DAY UNTIL THE SITUATION BECOMES CLARIFIED BY THE CARDIOLOGIST.    3.  IF HEART TESTS OUT TO BE FINE, THEN I WILL WORK ON THE NEXT MOST LIKELY CAUSE OF YOUR SYMPTOM WHICH WOULD BE PULMONARY - SUCH AS A NEW ASTHMATIC SITUATION PERHAPS.      4.  IN THE MEANTIME UNTIL YOU SEE CARDIOLOGY,  IF YOU SHOULD EXPERIENCE CHEST DISCOMFORT OR SHORTNESS OF BREATH THAT DOES NOT RESOLVE WITH REST, THEN YOU SHOULD BE SEEN URGENTLY IN THE ER TO EXCLUDE CARDIAC EVENT    5.  FOLLOW UP AS NEEDED AFTER CARDIOLOGY.    6.  EKG HERE IS UNREMARKABLE AND UNCHANGED FROM THE ONE DONE 3 YEARS AGO.  THIS IS REASSURING BUT STILL REQUIRES CARDIAC TESTING TO EXCLUDE ANY POTENTIAL ISSUES.

## 2024-04-18 ENCOUNTER — TELEPHONE (OUTPATIENT)
Dept: PRIMARY CARE | Facility: CLINIC | Age: 69
End: 2024-04-18
Payer: MEDICARE

## 2024-04-18 NOTE — TELEPHONE ENCOUNTER
GRACE.....Patient wanted to report that he started taking the low dose asprin as you suggested, 2 weeks ago, and already sees a change.  He is not as tired and not sleeping as much.    He is going to still have the stress test done which is scheduled 5/3/24.

## 2024-04-26 PROBLEM — Z86.0100 HISTORY OF COLONIC POLYPS: Status: ACTIVE | Noted: 2023-06-20

## 2024-04-26 PROBLEM — F19.21 HISTORY OF SUBSTANCE DEPENDENCE (MULTI): Status: ACTIVE | Noted: 2023-06-20

## 2024-04-26 PROBLEM — Z86.010 HISTORY OF COLONIC POLYPS: Status: ACTIVE | Noted: 2023-06-20

## 2024-04-26 PROBLEM — H90.0 CONDUCTIVE HEARING LOSS, BILATERAL: Status: ACTIVE | Noted: 2024-04-26

## 2024-04-26 PROBLEM — K57.30 DIVERTICULOSIS OF LARGE INTESTINE: Status: ACTIVE | Noted: 2023-06-20

## 2024-04-26 PROBLEM — M71.9 DISORDER OF BURSAE OF SHOULDER REGION: Status: ACTIVE | Noted: 2021-05-25

## 2024-04-26 PROBLEM — Z98.890 HISTORY OF REPAIR OF HIP JOINT: Status: ACTIVE | Noted: 2024-04-26

## 2024-04-26 RX ORDER — OMEPRAZOLE 40 MG/1
40 CAPSULE, DELAYED RELEASE ORAL DAILY
COMMUNITY
Start: 2020-03-10 | End: 2024-05-02 | Stop reason: WASHOUT

## 2024-05-02 ENCOUNTER — OFFICE VISIT (OUTPATIENT)
Dept: CARDIOLOGY | Facility: CLINIC | Age: 69
End: 2024-05-02
Payer: MEDICARE

## 2024-05-02 VITALS
OXYGEN SATURATION: 96 % | SYSTOLIC BLOOD PRESSURE: 131 MMHG | DIASTOLIC BLOOD PRESSURE: 81 MMHG | WEIGHT: 272 LBS | HEIGHT: 70 IN | HEART RATE: 88 BPM | BODY MASS INDEX: 38.94 KG/M2

## 2024-05-02 DIAGNOSIS — E78.2 MIXED HYPERLIPIDEMIA: ICD-10-CM

## 2024-05-02 DIAGNOSIS — M19.90 OSTEOARTHRITIS, UNSPECIFIED OSTEOARTHRITIS TYPE, UNSPECIFIED SITE: ICD-10-CM

## 2024-05-02 DIAGNOSIS — E66.09 CLASS 2 OBESITY DUE TO EXCESS CALORIES WITHOUT SERIOUS COMORBIDITY WITH BODY MASS INDEX (BMI) OF 38.0 TO 38.9 IN ADULT: Primary | ICD-10-CM

## 2024-05-02 DIAGNOSIS — R94.31 ABNORMAL EKG: ICD-10-CM

## 2024-05-02 DIAGNOSIS — I10 PRIMARY HYPERTENSION: ICD-10-CM

## 2024-05-02 DIAGNOSIS — R06.09 DYSPNEA ON EXERTION: ICD-10-CM

## 2024-05-02 DIAGNOSIS — R53.83 FATIGUE, UNSPECIFIED TYPE: ICD-10-CM

## 2024-05-02 DIAGNOSIS — R06.83 SNORING: ICD-10-CM

## 2024-05-02 DIAGNOSIS — E55.9 VITAMIN D DEFICIENCY: ICD-10-CM

## 2024-05-02 DIAGNOSIS — E78.5 HYPERLIPIDEMIA, UNSPECIFIED HYPERLIPIDEMIA TYPE: ICD-10-CM

## 2024-05-02 DIAGNOSIS — Z82.49 FAMILY HISTORY OF CARDIAC PACEMAKER: ICD-10-CM

## 2024-05-02 DIAGNOSIS — Z96.643 STATUS POST BILATERAL HIP REPLACEMENTS: ICD-10-CM

## 2024-05-02 PROCEDURE — 93000 ELECTROCARDIOGRAM COMPLETE: CPT | Performed by: INTERNAL MEDICINE

## 2024-05-02 PROCEDURE — 1157F ADVNC CARE PLAN IN RCRD: CPT | Performed by: INTERNAL MEDICINE

## 2024-05-02 PROCEDURE — 99205 OFFICE O/P NEW HI 60 MIN: CPT | Performed by: INTERNAL MEDICINE

## 2024-05-02 PROCEDURE — 3008F BODY MASS INDEX DOCD: CPT | Performed by: INTERNAL MEDICINE

## 2024-05-02 PROCEDURE — 1036F TOBACCO NON-USER: CPT | Performed by: INTERNAL MEDICINE

## 2024-05-02 PROCEDURE — 3075F SYST BP GE 130 - 139MM HG: CPT | Performed by: INTERNAL MEDICINE

## 2024-05-02 PROCEDURE — 3079F DIAST BP 80-89 MM HG: CPT | Performed by: INTERNAL MEDICINE

## 2024-05-02 PROCEDURE — 1126F AMNT PAIN NOTED NONE PRSNT: CPT | Performed by: INTERNAL MEDICINE

## 2024-05-02 PROCEDURE — 1159F MED LIST DOCD IN RCRD: CPT | Performed by: INTERNAL MEDICINE

## 2024-05-02 RX ORDER — ASPIRIN 81 MG/1
81 TABLET ORAL DAILY
COMMUNITY

## 2024-05-02 ASSESSMENT — PAIN SCALES - GENERAL: PAINLEVEL: 0-NO PAIN

## 2024-05-02 NOTE — PROGRESS NOTES
CARDIOLOGY CONSULTATION NOTE       Patient:    Chris Wen Jr.    YOB: 1955  MRN:    [id number]    Date:   5/2/2024    Primary Physician: Janak Nelson MD    Reason for Visit: Initial cardiology consultation for fatigue and dyspnea on exertion     IMPRESSION:      Fatigue  Dyspnea on exertion  Lightheadedness  Snoring, witnessed  Hypertension  Hyperlipidemia  Abnormal electrocardiogram  GERD, reported but patient denies  Degenerative joint disease, post prior bilateral total hip replacement  Obesity  Vitamin D deficiency  Family history of pacemaker  Otherwise as per assessment below.    RECOMMENDATIONS:      The patient has above-noted history and findings.  He has fatigue and dyspnea on exertion predominantly since October 2024.  He has not had prior cardiovascular testing.  He does have risk factors and would suggest further cardiovascular evaluation to exclude underlying noncardiac causes for his symptomatology.  Would suggest the following: CT calcium score, echocardiogram, exercise Myoview perfusion study.  From a pulmonary standpoint would suggest pulmonary function studies as well as chest x-ray PA and left lateral.  We did discuss the possibility of obstructive sleep apnea his wife is a respiratory therapist and does not believe that he needs a sleep evaluation as he does not snore when he lays on his side which is predominant.    Patient will continue his current medications.    Exercise dietary weight reduction program was encouraged.    Hydration was encouraged.    [employer] portal use was encouraged.    We will plan to see back following the above testing.     Patient will follow up with their primary physician for general care.    The patient knows to contact medical care earlier if need be.      HPI:     Chris Wen Jr. was seen in cardiac evaluation at the  Cardiology office May 2, 2024.      The patients problems are listed as in the impression above.     Electronic medical records reviewed.    68-year-old hypertensive, hyperlipidemic obese gentleman with recent onset of severe fatigue and dyspnea on exertion predominantly.  He was seen by Dr. Nelson who wished a cardiovascular evaluation to exclude the possibility of underlying cardiac cause of his symptomatology.  This is our first visit to establish cardiovascular care.    Patient overall states that he has been active without limitations prior.  He has had bilateral hip surgeries in the remote past.  He is playing golf weekly.  He states however that since October and has been extremely fatigued.  He is now sleeping 16 hours a day.  Feels tired after 8 hours of sleep and is tired throughout the day.  His wife is a respiratory therapist and states that he does snore but he sleeps on his sides and does not move and does not snore.  She does not feel he has obstructive sleep apnea.    He does have also significant dyspnea on exertion again since October.  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  He has a quite short of breath with activity.    He is actually lost 11 pounds with increased activity.    He has no other significant cardiovascular complaints.    Patient denies Chest Pain, Dizziness, TIA or CVA symptoms.  No CHF or Edema.  No Palpitations.  No GI,  or Bleeding Issues. No Recent Fever or Chills.     Cardiovascular and general review of systems is otherwise negative.    A 14-system review is otherwise negative, other than noted.    ALLERGIES:     No Known Allergies     MEDICATIONS:     Current Outpatient Medications   Medication Instructions    ascorbic acid (Vitamin C) 500 mg chewable tablet oral    aspirin 81 mg, oral, Daily    losartan (COZAAR) 100 mg, oral, Daily    lovastatin (MEVACOR) 40 mg, oral, Daily RT    multivitamin tablet oral       PAST MEDICAL HISTORY:   As per impression above.  No other significant past medical or  surgical history appreciated.    SOCIAL HISTORY:   .  Former .  Retired.    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  Denies tobacco or illicit drug use.  Occasional alcohol social drinking.    FAMILY HISTORY:   Positive family history of pacemaker    VITALS:     Vitals:    05/02/24 1003   BP: 131/81   Pulse: 88   SpO2: 96%       Wt Readings from Last 4 Encounters:   05/02/24 123 kg (272 lb)   04/10/24 123 kg (271 lb)   11/20/23 127 kg (280 lb)   07/27/23 122 kg (268 lb)       PHYSICAL EXAMINATION:      General: No acute distress. Vital signs as noted. Alert and oriented.  Head And Neck Examination: No jugular venous distention, no carotid bruits, no mass. Carotid upstrokes preserved. Oral mucosa moist.  No xanthelasma. Head and neck examination otherwise unremarkable.  Lungs: Clear to auscultation and percussion. No wheezes, no rales,  and no rhonchi.  Chest: Excursion appeared to be normal. No chest wall tenderness on palpation.  Heart: Normal S1 and S2. No S3. No S4. No rub. Grade 1/6 systolic murmur, best heard at the left sternal border. Point of maximal impulse was within normal limits.  Abdomen: Soft. Nontender. No organomegaly. No bruits. No masses. Obese.  Extremities: No bipedal edema. No clubbing. No cyanosis.  Pulses are strong throughout. No bruits.  Musculoskeletal Exam: No ulcers, otherwise unremarkable.  Neuro: Neurologically appeared grossly intact.    ELECTROCARDIOGRAM:      Sinus rhythm, nonspecific ST wave changes.  Borderline left ventricular hypertrophy.  Rate 86.    CARDIAC TESTING:      None    LABORATORY DATA:      CBC:   Lab Results   Component Value Date    WBC 7.0 03/11/2024    RBC 4.75 03/11/2024    HGB 14.2 03/11/2024    HCT 43.9 03/11/2024     03/11/2024        CMP:    Lab Results   Component Value Date     03/11/2024    K 4.4 03/11/2024     03/11/2024    CO2 28 03/11/2024    BUN 18 03/11/2024    CREATININE 1.00 03/11/2024    GLUCOSE 88  03/11/2024    CALCIUM 8.5 (L) 03/11/2024     Lipid Profile:    Lab Results   Component Value Date    CHOL 152 03/11/2024    TRIG 228 (H) 03/11/2024    HDL 44.9 03/11/2024    LDLF 82 12/29/2022     Hepatic Function Panel:    Lab Results   Component Value Date    ALKPHOS 43 03/11/2024    ALT 50 03/11/2024    AST 42 (H) 03/11/2024    PROT 6.3 (L) 03/11/2024    BILITOT 0.4 03/11/2024     TSH:    Lab Results   Component Value Date    TSH 3.04 03/11/2024               PROBLEM LIST:     Patient Active Problem List   Diagnosis    Allergic contact dermatitis due to plants, except food    Arthritis of left hip    Chronic cough    Contact dermatitis    GERD (gastroesophageal reflux disease)    Hyperlipidemia    Hypertension    Mild vitamin D deficiency    Rash    Stye    Nephropathy hypertensive    Class 2 obesity due to excess calories with body mass index (BMI) of 38.0 to 38.9 in adult    Medicare annual wellness visit, subsequent    Class 3 severe obesity with body mass index (BMI) of 40.0 to 44.9 in adult (Multi)    Dyspnea on exertion    Conductive hearing loss, bilateral    Disorder of bursae of shoulder region    Diverticulosis of large intestine    Greater trochanteric pain syndrome    History of colonic polyps    History of repair of hip joint    History of substance dependence (Multi)    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    Patient was seen and examined with total time of visit including chart preparation, rooming, and chart completion  exceeding 40 minutes.    ----

## 2024-05-02 NOTE — PATIENT INSTRUCTIONS
Exercise diet weight loss program.    Hydrate    Use My Chart portal for reviewing records, testing and contacting office.     Get testing as scheduled.  Will review on follow-up visit.

## 2024-05-21 ENCOUNTER — HOSPITAL ENCOUNTER (OUTPATIENT)
Dept: RADIOLOGY | Facility: HOSPITAL | Age: 69
Discharge: HOME | End: 2024-05-21
Payer: MEDICARE

## 2024-05-21 ENCOUNTER — HOSPITAL ENCOUNTER (OUTPATIENT)
Dept: CARDIOLOGY | Facility: HOSPITAL | Age: 69
Discharge: HOME | End: 2024-05-21
Payer: MEDICARE

## 2024-05-21 DIAGNOSIS — Z82.49 FAMILY HISTORY OF CARDIAC PACEMAKER: ICD-10-CM

## 2024-05-21 DIAGNOSIS — E78.5 HYPERLIPIDEMIA, UNSPECIFIED HYPERLIPIDEMIA TYPE: ICD-10-CM

## 2024-05-21 DIAGNOSIS — I10 PRIMARY HYPERTENSION: ICD-10-CM

## 2024-05-21 DIAGNOSIS — R53.83 FATIGUE, UNSPECIFIED TYPE: ICD-10-CM

## 2024-05-21 DIAGNOSIS — R06.83 SNORING: ICD-10-CM

## 2024-05-21 DIAGNOSIS — E66.09 CLASS 2 OBESITY DUE TO EXCESS CALORIES WITHOUT SERIOUS COMORBIDITY WITH BODY MASS INDEX (BMI) OF 38.0 TO 38.9 IN ADULT: ICD-10-CM

## 2024-05-21 DIAGNOSIS — R94.31 ABNORMAL EKG: ICD-10-CM

## 2024-05-21 DIAGNOSIS — E55.9 VITAMIN D DEFICIENCY: ICD-10-CM

## 2024-05-21 DIAGNOSIS — R06.09 DYSPNEA ON EXERTION: ICD-10-CM

## 2024-05-21 DIAGNOSIS — E78.2 MIXED HYPERLIPIDEMIA: ICD-10-CM

## 2024-05-21 DIAGNOSIS — Z96.643 STATUS POST BILATERAL HIP REPLACEMENTS: ICD-10-CM

## 2024-05-21 DIAGNOSIS — M19.90 OSTEOARTHRITIS, UNSPECIFIED OSTEOARTHRITIS TYPE, UNSPECIFIED SITE: ICD-10-CM

## 2024-05-21 PROCEDURE — A9502 TC99M TETROFOSMIN: HCPCS | Performed by: INTERNAL MEDICINE

## 2024-05-21 PROCEDURE — 93017 CV STRESS TEST TRACING ONLY: CPT

## 2024-05-21 PROCEDURE — 3430000001 HC RX 343 DIAGNOSTIC RADIOPHARMACEUTICALS: Performed by: INTERNAL MEDICINE

## 2024-05-21 PROCEDURE — 93018 CV STRESS TEST I&R ONLY: CPT | Performed by: INTERNAL MEDICINE

## 2024-05-21 PROCEDURE — 93016 CV STRESS TEST SUPVJ ONLY: CPT | Performed by: INTERNAL MEDICINE

## 2024-05-21 PROCEDURE — 71046 X-RAY EXAM CHEST 2 VIEWS: CPT

## 2024-05-21 PROCEDURE — 78452 HT MUSCLE IMAGE SPECT MULT: CPT

## 2024-05-21 PROCEDURE — 78452 HT MUSCLE IMAGE SPECT MULT: CPT | Performed by: STUDENT IN AN ORGANIZED HEALTH CARE EDUCATION/TRAINING PROGRAM

## 2024-05-21 PROCEDURE — 71046 X-RAY EXAM CHEST 2 VIEWS: CPT | Performed by: RADIOLOGY

## 2024-05-21 RX ADMIN — TETROFOSMIN 35.2 MILLICURIE: 0.23 INJECTION, POWDER, LYOPHILIZED, FOR SOLUTION INTRAVENOUS at 11:10

## 2024-05-21 RX ADMIN — TETROFOSMIN 10.1 MILLICURIE: 0.23 INJECTION, POWDER, LYOPHILIZED, FOR SOLUTION INTRAVENOUS at 09:45

## 2024-05-30 ENCOUNTER — HOSPITAL ENCOUNTER (OUTPATIENT)
Dept: CARDIOLOGY | Facility: CLINIC | Age: 69
Discharge: HOME | End: 2024-05-30
Payer: MEDICARE

## 2024-05-30 DIAGNOSIS — E66.09 CLASS 2 OBESITY DUE TO EXCESS CALORIES WITHOUT SERIOUS COMORBIDITY WITH BODY MASS INDEX (BMI) OF 38.0 TO 38.9 IN ADULT: ICD-10-CM

## 2024-05-30 DIAGNOSIS — R06.09 DYSPNEA ON EXERTION: ICD-10-CM

## 2024-05-30 DIAGNOSIS — E78.5 HYPERLIPIDEMIA, UNSPECIFIED HYPERLIPIDEMIA TYPE: ICD-10-CM

## 2024-05-30 DIAGNOSIS — Z82.49 FAMILY HISTORY OF CARDIAC PACEMAKER: ICD-10-CM

## 2024-05-30 DIAGNOSIS — R94.31 ABNORMAL EKG: ICD-10-CM

## 2024-05-30 DIAGNOSIS — E55.9 VITAMIN D DEFICIENCY: ICD-10-CM

## 2024-05-30 DIAGNOSIS — E78.2 MIXED HYPERLIPIDEMIA: ICD-10-CM

## 2024-05-30 DIAGNOSIS — M19.90 OSTEOARTHRITIS, UNSPECIFIED OSTEOARTHRITIS TYPE, UNSPECIFIED SITE: ICD-10-CM

## 2024-05-30 DIAGNOSIS — R53.83 FATIGUE, UNSPECIFIED TYPE: ICD-10-CM

## 2024-05-30 DIAGNOSIS — I10 PRIMARY HYPERTENSION: ICD-10-CM

## 2024-05-30 DIAGNOSIS — Z96.643 STATUS POST BILATERAL HIP REPLACEMENTS: ICD-10-CM

## 2024-05-30 DIAGNOSIS — R06.83 SNORING: ICD-10-CM

## 2024-05-30 PROCEDURE — 93306 TTE W/DOPPLER COMPLETE: CPT

## 2024-05-30 PROCEDURE — 93306 TTE W/DOPPLER COMPLETE: CPT | Performed by: STUDENT IN AN ORGANIZED HEALTH CARE EDUCATION/TRAINING PROGRAM

## 2024-05-31 LAB
AORTIC VALVE MEAN GRADIENT: 3.8 MMHG
AORTIC VALVE PEAK VELOCITY: 1.59 M/S
AV PEAK GRADIENT: 10.1 MMHG
AVA (PEAK VEL): 3.33 CM2
AVA (VTI): 4.3 CM2
EJECTION FRACTION APICAL 4 CHAMBER: 64.8
LEFT ATRIUM VOLUME AREA LENGTH INDEX BSA: 20.2 ML/M2
LEFT VENTRICLE INTERNAL DIMENSION DIASTOLE: 4.4 CM (ref 3.5–6)
LEFT VENTRICULAR OUTFLOW TRACT DIAMETER: 2.3 CM
LV EJECTION FRACTION BIPLANE: 61 %
MITRAL VALVE E/A RATIO: 0.8
MITRAL VALVE E/E' RATIO: 10.28
RIGHT VENTRICLE FREE WALL PEAK S': 11 CM/S
RIGHT VENTRICLE PEAK SYSTOLIC PRESSURE: 33.3 MMHG
TRICUSPID ANNULAR PLANE SYSTOLIC EXCURSION: 1.7 CM

## 2024-06-08 ENCOUNTER — HOSPITAL ENCOUNTER (OUTPATIENT)
Dept: RADIOLOGY | Facility: HOSPITAL | Age: 69
Discharge: HOME | End: 2024-06-08
Payer: MEDICARE

## 2024-06-08 DIAGNOSIS — R53.83 FATIGUE, UNSPECIFIED TYPE: ICD-10-CM

## 2024-06-08 DIAGNOSIS — E66.09 CLASS 2 OBESITY DUE TO EXCESS CALORIES WITHOUT SERIOUS COMORBIDITY WITH BODY MASS INDEX (BMI) OF 38.0 TO 38.9 IN ADULT: ICD-10-CM

## 2024-06-08 DIAGNOSIS — E55.9 VITAMIN D DEFICIENCY: ICD-10-CM

## 2024-06-08 DIAGNOSIS — E78.2 MIXED HYPERLIPIDEMIA: ICD-10-CM

## 2024-06-08 DIAGNOSIS — M19.90 OSTEOARTHRITIS, UNSPECIFIED OSTEOARTHRITIS TYPE, UNSPECIFIED SITE: ICD-10-CM

## 2024-06-08 DIAGNOSIS — E78.5 HYPERLIPIDEMIA, UNSPECIFIED HYPERLIPIDEMIA TYPE: ICD-10-CM

## 2024-06-08 DIAGNOSIS — I10 PRIMARY HYPERTENSION: ICD-10-CM

## 2024-06-08 DIAGNOSIS — R06.09 DYSPNEA ON EXERTION: ICD-10-CM

## 2024-06-08 DIAGNOSIS — R94.31 ABNORMAL EKG: ICD-10-CM

## 2024-06-08 DIAGNOSIS — Z82.49 FAMILY HISTORY OF CARDIAC PACEMAKER: ICD-10-CM

## 2024-06-08 DIAGNOSIS — Z96.643 STATUS POST BILATERAL HIP REPLACEMENTS: ICD-10-CM

## 2024-06-08 DIAGNOSIS — R06.83 SNORING: ICD-10-CM

## 2024-06-08 PROCEDURE — 75571 CT HRT W/O DYE W/CA TEST: CPT

## 2024-06-20 ENCOUNTER — HOSPITAL ENCOUNTER (OUTPATIENT)
Dept: RESPIRATORY THERAPY | Facility: HOSPITAL | Age: 69
Discharge: HOME | End: 2024-06-20
Payer: MEDICARE

## 2024-06-20 DIAGNOSIS — R94.31 ABNORMAL EKG: ICD-10-CM

## 2024-06-20 DIAGNOSIS — R53.83 FATIGUE, UNSPECIFIED TYPE: ICD-10-CM

## 2024-06-20 DIAGNOSIS — M19.90 OSTEOARTHRITIS, UNSPECIFIED OSTEOARTHRITIS TYPE, UNSPECIFIED SITE: ICD-10-CM

## 2024-06-20 DIAGNOSIS — R06.09 DYSPNEA ON EXERTION: ICD-10-CM

## 2024-06-20 DIAGNOSIS — Z82.49 FAMILY HISTORY OF CARDIAC PACEMAKER: ICD-10-CM

## 2024-06-20 DIAGNOSIS — E66.09 CLASS 2 OBESITY DUE TO EXCESS CALORIES WITHOUT SERIOUS COMORBIDITY WITH BODY MASS INDEX (BMI) OF 38.0 TO 38.9 IN ADULT: ICD-10-CM

## 2024-06-20 DIAGNOSIS — I10 PRIMARY HYPERTENSION: ICD-10-CM

## 2024-06-20 DIAGNOSIS — Z96.643 STATUS POST BILATERAL HIP REPLACEMENTS: ICD-10-CM

## 2024-06-20 DIAGNOSIS — R06.83 SNORING: ICD-10-CM

## 2024-06-20 DIAGNOSIS — E78.2 MIXED HYPERLIPIDEMIA: ICD-10-CM

## 2024-06-20 DIAGNOSIS — E78.5 HYPERLIPIDEMIA, UNSPECIFIED HYPERLIPIDEMIA TYPE: ICD-10-CM

## 2024-06-20 DIAGNOSIS — E55.9 VITAMIN D DEFICIENCY: ICD-10-CM

## 2024-06-20 LAB
MGC ASCENT PFT - FEV1 - POST: 2.65
MGC ASCENT PFT - FEV1 - PRE: 2.44
MGC ASCENT PFT - FEV1 - PREDICTED: 3.05
MGC ASCENT PFT - FVC - POST: 3.67
MGC ASCENT PFT - FVC - PRE: 3.23
MGC ASCENT PFT - FVC - PREDICTED: 4.04

## 2024-06-20 PROCEDURE — 94726 PLETHYSMOGRAPHY LUNG VOLUMES: CPT

## 2024-06-25 ENCOUNTER — HOSPITAL ENCOUNTER (OUTPATIENT)
Dept: RESPIRATORY THERAPY | Facility: HOSPITAL | Age: 69
Discharge: HOME | End: 2024-06-25
Payer: MEDICARE

## 2024-06-25 DIAGNOSIS — E66.09 CLASS 2 OBESITY DUE TO EXCESS CALORIES WITHOUT SERIOUS COMORBIDITY WITH BODY MASS INDEX (BMI) OF 38.0 TO 38.9 IN ADULT: ICD-10-CM

## 2024-06-25 DIAGNOSIS — Z82.49 FAMILY HISTORY OF CARDIAC PACEMAKER: ICD-10-CM

## 2024-06-25 DIAGNOSIS — I10 PRIMARY HYPERTENSION: ICD-10-CM

## 2024-06-25 DIAGNOSIS — Z96.643 STATUS POST BILATERAL HIP REPLACEMENTS: ICD-10-CM

## 2024-06-25 DIAGNOSIS — R06.83 SNORING: ICD-10-CM

## 2024-06-25 DIAGNOSIS — M19.90 OSTEOARTHRITIS, UNSPECIFIED OSTEOARTHRITIS TYPE, UNSPECIFIED SITE: ICD-10-CM

## 2024-06-25 DIAGNOSIS — E78.2 MIXED HYPERLIPIDEMIA: ICD-10-CM

## 2024-06-25 DIAGNOSIS — R94.31 ABNORMAL EKG: ICD-10-CM

## 2024-06-25 DIAGNOSIS — R06.09 DYSPNEA ON EXERTION: ICD-10-CM

## 2024-06-25 DIAGNOSIS — R53.83 FATIGUE, UNSPECIFIED TYPE: ICD-10-CM

## 2024-06-25 DIAGNOSIS — E78.5 HYPERLIPIDEMIA, UNSPECIFIED HYPERLIPIDEMIA TYPE: ICD-10-CM

## 2024-06-25 DIAGNOSIS — E55.9 VITAMIN D DEFICIENCY: ICD-10-CM

## 2024-06-25 LAB
MGC ASCENT PFT - FEV1 - POST: 2.57
MGC ASCENT PFT - FEV1 - PRE: 2.57
MGC ASCENT PFT - FEV1 - PREDICTED: 3.05
MGC ASCENT PFT - FVC - POST: 3.59
MGC ASCENT PFT - FVC - PRE: 3.49
MGC ASCENT PFT - FVC - PREDICTED: 4.04

## 2024-06-25 PROCEDURE — 2500000004 HC RX 250 GENERAL PHARMACY W/ HCPCS (ALT 636 FOR OP/ED)

## 2024-06-25 PROCEDURE — 95070 INHLJ BRNCL CHALLENGE TSTG: CPT

## 2024-06-27 ENCOUNTER — APPOINTMENT (OUTPATIENT)
Dept: CARDIOLOGY | Facility: CLINIC | Age: 69
End: 2024-06-27
Payer: MEDICARE

## 2024-06-27 VITALS
SYSTOLIC BLOOD PRESSURE: 122 MMHG | HEART RATE: 82 BPM | BODY MASS INDEX: 37.34 KG/M2 | DIASTOLIC BLOOD PRESSURE: 78 MMHG | OXYGEN SATURATION: 96 % | WEIGHT: 260.8 LBS | HEIGHT: 70 IN

## 2024-06-27 DIAGNOSIS — E55.9 VITAMIN D DEFICIENCY: ICD-10-CM

## 2024-06-27 DIAGNOSIS — E66.09 CLASS 2 OBESITY DUE TO EXCESS CALORIES WITHOUT SERIOUS COMORBIDITY WITH BODY MASS INDEX (BMI) OF 38.0 TO 38.9 IN ADULT: ICD-10-CM

## 2024-06-27 DIAGNOSIS — R94.31 ABNORMAL EKG: ICD-10-CM

## 2024-06-27 DIAGNOSIS — I77.810 ASCENDING AORTA DILATATION (CMS-HCC): ICD-10-CM

## 2024-06-27 DIAGNOSIS — E78.2 MIXED HYPERLIPIDEMIA: ICD-10-CM

## 2024-06-27 DIAGNOSIS — E78.5 HYPERLIPIDEMIA, UNSPECIFIED HYPERLIPIDEMIA TYPE: ICD-10-CM

## 2024-06-27 DIAGNOSIS — I51.9 RIGHT VENTRICULAR DYSFUNCTION: ICD-10-CM

## 2024-06-27 DIAGNOSIS — Z82.49 FAMILY HISTORY OF CARDIAC PACEMAKER: ICD-10-CM

## 2024-06-27 DIAGNOSIS — Z96.643 STATUS POST BILATERAL HIP REPLACEMENTS: ICD-10-CM

## 2024-06-27 DIAGNOSIS — I51.7 LVH (LEFT VENTRICULAR HYPERTROPHY): ICD-10-CM

## 2024-06-27 DIAGNOSIS — I51.89 DIASTOLIC DYSFUNCTION: ICD-10-CM

## 2024-06-27 DIAGNOSIS — I51.7 ENLARGED RV (RIGHT VENTRICLE): ICD-10-CM

## 2024-06-27 DIAGNOSIS — R93.1 ELEVATED CORONARY ARTERY CALCIUM SCORE: ICD-10-CM

## 2024-06-27 DIAGNOSIS — M19.90 OSTEOARTHRITIS, UNSPECIFIED OSTEOARTHRITIS TYPE, UNSPECIFIED SITE: ICD-10-CM

## 2024-06-27 DIAGNOSIS — I10 PRIMARY HYPERTENSION: ICD-10-CM

## 2024-06-27 DIAGNOSIS — R53.83 FATIGUE, UNSPECIFIED TYPE: ICD-10-CM

## 2024-06-27 DIAGNOSIS — R06.09 DYSPNEA ON EXERTION: ICD-10-CM

## 2024-06-27 DIAGNOSIS — R94.30 ABNORMAL CARDIOVASCULAR FUNCTION STUDY: Primary | ICD-10-CM

## 2024-06-27 DIAGNOSIS — R06.83 SNORING: ICD-10-CM

## 2024-06-27 PROCEDURE — 1159F MED LIST DOCD IN RCRD: CPT | Performed by: INTERNAL MEDICINE

## 2024-06-27 PROCEDURE — 3074F SYST BP LT 130 MM HG: CPT | Performed by: INTERNAL MEDICINE

## 2024-06-27 PROCEDURE — 99214 OFFICE O/P EST MOD 30 MIN: CPT | Performed by: INTERNAL MEDICINE

## 2024-06-27 PROCEDURE — 3008F BODY MASS INDEX DOCD: CPT | Performed by: INTERNAL MEDICINE

## 2024-06-27 PROCEDURE — 1157F ADVNC CARE PLAN IN RCRD: CPT | Performed by: INTERNAL MEDICINE

## 2024-06-27 PROCEDURE — 1126F AMNT PAIN NOTED NONE PRSNT: CPT | Performed by: INTERNAL MEDICINE

## 2024-06-27 PROCEDURE — 3078F DIAST BP <80 MM HG: CPT | Performed by: INTERNAL MEDICINE

## 2024-06-27 ASSESSMENT — PAIN SCALES - GENERAL: PAINLEVEL: 0-NO PAIN

## 2024-06-27 NOTE — PROGRESS NOTES
CARDIOLOGY OFFICE NOTE     Date:   6/27/2024    Patient:    Chris Wen Jr.    YOB: 1955    Primary Physician: Janak Nelson MD       Reason for Visit: Follow-up cardiology visit for testing.    HPI:     Chris Wen Jr. was seen in cardiac evaluation at the  Cardiology office June 27, 2024.      The patients problems are listed as in the impression below.    Electronic medical records reviewed.    Patient returns.  Still has dyspnea on exertion and fatigue.    Testing as noted below.    Patient denies TIA or CVA symptoms.  No CHF or Edema.  No Palpitations.  No GI,  or Bleeding Issues. No Recent Fever or Chills.     Cardiovascular and general review of systems is otherwise negative.    A 14-system review is otherwise negative, other than noted.     PHYSICAL EXAMINATION:      Vitals:    06/27/24 1506   BP: 122/78   Pulse: 82   SpO2: 96%     General: No acute distress. Vital signs as noted. Alert and oriented.  Head And Neck Examination: No jugular venous distention, no carotid bruits, no mass. Carotid upstrokes preserved. Oral mucosa moist.  No xanthelasma. Head and neck examination otherwise unremarkable.  Lungs: Clear to auscultation and percussion. No wheezes, no rales,  and no rhonchi.  Chest: Excursion appeared to be normal. No chest wall tenderness on palpation.  Heart: Normal S1 and S2. No S3. No S4. No rub. Grade 1/6 systolic murmur, best heard at the left sternal border. Point of maximal impulse was within normal limits.  Abdomen: Soft. Nontender. No organomegaly. No bruits. No masses. Obese.  Extremities: No bipedal edema. No clubbing. No cyanosis.  Pulses are strong throughout. No bruits.  Musculoskeletal Exam: No ulcers, otherwise unremarkable.  Neuro: Neurologically appeared grossly intact.  .  IMPRESSION:      Fatigue  Dyspnea on exertion  Lightheadedness  Snoring, witnessed  Hypertension  Hyperlipidemia  Abnormal electrocardiogram  Coronary artery  disease, positive CT calcium score, 73, 5/2024  Negative Lexiscan Myoview perfusion stress test, LVEF 65%, 5/2024.  Normal LV systolic function, LVEF 60%, echocardiogram 5/2024  Left ventricular hypertrophy, asymmetric, 1.4 cm by echocardiogram 5/2024  Left ventricular diastolic dysfunction  Right ventricular enlargement and hypokinesia, echocardiogram 5/2024  GERD, reported but patient denies  Degenerative joint disease, post prior bilateral total hip replacement  Obesity  Vitamin D deficiency  Family history of pacemaker  Otherwise as per assessment below.    RECOMMENDATIONS:      Patient has above-noted findings.  Would suggest given his dyspnea on exertion, the abnormal on testing with asymmetric left ventricular hypertrophy and particularly right ventricular abnormalities that we obtain cardiac MR for better cardiovascular structural assessment.  He is agreeable.  Further recommendation will rendered following.    He was reassured from the coronary artery disease standpoint.  He does have coronary artery disease but he has a negative stress test.  Would suggest that we continue his current medications at this time.  Refills were provided.    Exercise dietary program was encouraged.  Hydration.    [facility] portal use was encouraged.    We will plan to see back following the above testing with Laboratory Studies and ECG as ordered.     Patient will follow up with their primary physician for general care.    The patient knows to contact medical care earlier if need be.    ALLERGIES:     No Known Allergies     MEDICATIONS:     Current Outpatient Medications   Medication Instructions    ascorbic acid (Vitamin C) 500 mg chewable tablet oral    aspirin 81 mg, oral, Daily    losartan (COZAAR) 100 mg, oral, Daily    lovastatin (MEVACOR) 40 mg, oral, Daily RT    multivitamin tablet oral       ELECTROCARDIOGRAM:      None this visit    CARDIAC TESTING:      Lexiscan perfusion stress test: 5/2024:  Normal.  No ischemia or  MI.  LVEF 65%.    CT calcium score, 5/2024:  Elevated CT calcium score of 73.  Ascending aorta measuring 3.8 cm.    Echocardiogram, 5/2024:  Normal LV systolic function.  LVEF 60%.  Left ventricular hypertrophy, asymmetric (1.4 cm)  Diastolic dysfunction  RV enlargement and hypokinesia  RVSP 33    LABORATORY DATA:      CBC:   Lab Results   Component Value Date    WBC 7.0 03/11/2024    RBC 4.75 03/11/2024    HGB 14.2 03/11/2024    HCT 43.9 03/11/2024     03/11/2024        CMP:    Lab Results   Component Value Date     03/11/2024    K 4.4 03/11/2024     03/11/2024    CO2 28 03/11/2024    BUN 18 03/11/2024    CREATININE 1.00 03/11/2024    GLUCOSE 88 03/11/2024    CALCIUM 8.5 (L) 03/11/2024     Lipid Profile:    Lab Results   Component Value Date    CHOL 152 03/11/2024    TRIG 228 (H) 03/11/2024    HDL 44.9 03/11/2024    LDLF 82 12/29/2022       Hepatic Function Panel:    Lab Results   Component Value Date    ALKPHOS 43 03/11/2024    ALT 50 03/11/2024    AST 42 (H) 03/11/2024    PROT 6.3 (L) 03/11/2024    BILITOT 0.4 03/11/2024       TSH:    Lab Results   Component Value Date    TSH 3.04 03/11/2024                 PROBLEM LIST:     Patient Active Problem List   Diagnosis    Allergic contact dermatitis due to plants, except food    Arthritis of left hip    Chronic cough    Contact dermatitis    GERD (gastroesophageal reflux disease)    Hyperlipidemia    Hypertension    Vitamin D deficiency    Rash    Stye    Nephropathy hypertensive    Class 2 obesity due to excess calories with body mass index (BMI) of 38.0 to 38.9 in adult    Medicare annual wellness visit, subsequent    Class 3 severe obesity with body mass index (BMI) of 40.0 to 44.9 in adult (Multi)    Dyspnea on exertion    Conductive hearing loss, bilateral    Disorder of bursae of shoulder region    Diverticulosis of large intestine    Greater trochanteric pain syndrome    History of colonic polyps    History of repair of hip joint    History  of substance dependence (Multi)    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)    Pain in joint involving pelvic region and thigh    Presence of right artificial hip joint    Primary localized osteoarthritis of pelvic region and thigh    Osteoarthritis    Status post bilateral hip replacements    Snoring    Family history of cardiac pacemaker    Abnormal EKG    Fatigue             Mal Bravo MD, [facility] / [facility] /  Cardiology      Of Note:  [employer] voice recognition dictation software was utilized partially in the preparation of this note, therefore, inaccuracies in spelling, word choice and punctuation may have occurred which were not recognized the time of signing.    Patient was seen and examined with total time of visit including chart preparation, rooming, and chart completion exceeding 40 minutes.      ----

## 2024-06-27 NOTE — PATIENT INSTRUCTIONS
Exercise diet weight loss program.    Hydrate    Use My Chart portal for reviewing records, testing and contacting office.     Follow-up after cardiac MR study.

## 2024-07-02 PROBLEM — I51.89 DIASTOLIC DYSFUNCTION: Status: ACTIVE | Noted: 2024-07-02

## 2024-07-09 ENCOUNTER — LAB (OUTPATIENT)
Dept: LAB | Facility: LAB | Age: 69
End: 2024-07-09
Payer: MEDICARE

## 2024-07-09 DIAGNOSIS — E78.2 MIXED HYPERLIPIDEMIA: ICD-10-CM

## 2024-07-09 DIAGNOSIS — R94.30 ABNORMAL CARDIOVASCULAR FUNCTION STUDY: ICD-10-CM

## 2024-07-09 DIAGNOSIS — I51.9 RIGHT VENTRICULAR DYSFUNCTION: ICD-10-CM

## 2024-07-09 DIAGNOSIS — R94.31 ABNORMAL EKG: ICD-10-CM

## 2024-07-09 DIAGNOSIS — R06.83 SNORING: ICD-10-CM

## 2024-07-09 DIAGNOSIS — R06.09 DYSPNEA ON EXERTION: ICD-10-CM

## 2024-07-09 DIAGNOSIS — Z82.49 FAMILY HISTORY OF CARDIAC PACEMAKER: ICD-10-CM

## 2024-07-09 DIAGNOSIS — I77.810 ASCENDING AORTA DILATATION (CMS-HCC): ICD-10-CM

## 2024-07-09 DIAGNOSIS — E78.5 HYPERLIPIDEMIA, UNSPECIFIED HYPERLIPIDEMIA TYPE: ICD-10-CM

## 2024-07-09 DIAGNOSIS — E66.09 CLASS 2 OBESITY DUE TO EXCESS CALORIES WITHOUT SERIOUS COMORBIDITY WITH BODY MASS INDEX (BMI) OF 38.0 TO 38.9 IN ADULT: ICD-10-CM

## 2024-07-09 DIAGNOSIS — R93.1 ELEVATED CORONARY ARTERY CALCIUM SCORE: ICD-10-CM

## 2024-07-09 DIAGNOSIS — I51.7 ENLARGED RV (RIGHT VENTRICLE): ICD-10-CM

## 2024-07-09 DIAGNOSIS — Z96.643 STATUS POST BILATERAL HIP REPLACEMENTS: ICD-10-CM

## 2024-07-09 DIAGNOSIS — M19.90 OSTEOARTHRITIS, UNSPECIFIED OSTEOARTHRITIS TYPE, UNSPECIFIED SITE: ICD-10-CM

## 2024-07-09 DIAGNOSIS — I51.7 LVH (LEFT VENTRICULAR HYPERTROPHY): ICD-10-CM

## 2024-07-09 DIAGNOSIS — I10 PRIMARY HYPERTENSION: ICD-10-CM

## 2024-07-09 DIAGNOSIS — R53.83 FATIGUE, UNSPECIFIED TYPE: ICD-10-CM

## 2024-07-09 DIAGNOSIS — E55.9 VITAMIN D DEFICIENCY: ICD-10-CM

## 2024-07-09 PROCEDURE — 83735 ASSAY OF MAGNESIUM: CPT

## 2024-07-09 PROCEDURE — 80053 COMPREHEN METABOLIC PANEL: CPT

## 2024-07-09 PROCEDURE — 80061 LIPID PANEL: CPT

## 2024-07-09 PROCEDURE — 36415 COLL VENOUS BLD VENIPUNCTURE: CPT

## 2024-07-09 PROCEDURE — 85027 COMPLETE CBC AUTOMATED: CPT

## 2024-07-09 PROCEDURE — 82248 BILIRUBIN DIRECT: CPT

## 2024-07-10 LAB
ALBUMIN SERPL BCP-MCNC: 4 G/DL (ref 3.4–5)
ALP SERPL-CCNC: 48 U/L (ref 33–136)
ALT SERPL W P-5'-P-CCNC: 29 U/L (ref 10–52)
ANION GAP SERPL CALC-SCNC: 12 MMOL/L (ref 10–20)
AST SERPL W P-5'-P-CCNC: 24 U/L (ref 9–39)
BILIRUB DIRECT SERPL-MCNC: 0.1 MG/DL (ref 0–0.3)
BILIRUB SERPL-MCNC: 0.6 MG/DL (ref 0–1.2)
BUN SERPL-MCNC: 16 MG/DL (ref 6–23)
CALCIUM SERPL-MCNC: 9 MG/DL (ref 8.6–10.6)
CHLORIDE SERPL-SCNC: 101 MMOL/L (ref 98–107)
CHOLEST SERPL-MCNC: 164 MG/DL (ref 0–199)
CHOLESTEROL/HDL RATIO: 3.2
CO2 SERPL-SCNC: 30 MMOL/L (ref 21–32)
CREAT SERPL-MCNC: 0.94 MG/DL (ref 0.5–1.3)
EGFRCR SERPLBLD CKD-EPI 2021: 87 ML/MIN/1.73M*2
ERYTHROCYTE [DISTWIDTH] IN BLOOD BY AUTOMATED COUNT: 13.7 % (ref 11.5–14.5)
GLUCOSE SERPL-MCNC: 104 MG/DL (ref 74–99)
HCT VFR BLD AUTO: 47.9 % (ref 41–52)
HDLC SERPL-MCNC: 50.7 MG/DL
HGB BLD-MCNC: 14.7 G/DL (ref 13.5–17.5)
LDLC SERPL CALC-MCNC: 66 MG/DL
MAGNESIUM SERPL-MCNC: 2.12 MG/DL (ref 1.6–2.4)
MCH RBC QN AUTO: 29.9 PG (ref 26–34)
MCHC RBC AUTO-ENTMCNC: 30.7 G/DL (ref 32–36)
MCV RBC AUTO: 98 FL (ref 80–100)
MGC ASCENT PFT - FEV1 - POST: 2.65
MGC ASCENT PFT - FEV1 - PRE: 2.44
MGC ASCENT PFT - FEV1 - PREDICTED: 3.05
MGC ASCENT PFT - FVC - POST: 3.67
MGC ASCENT PFT - FVC - PRE: 3.23
MGC ASCENT PFT - FVC - PREDICTED: 4.04
NON HDL CHOLESTEROL: 113 MG/DL (ref 0–149)
NRBC BLD-RTO: 0 /100 WBCS (ref 0–0)
PLATELET # BLD AUTO: 220 X10*3/UL (ref 150–450)
POTASSIUM SERPL-SCNC: 4.3 MMOL/L (ref 3.5–5.3)
PROT SERPL-MCNC: 6.8 G/DL (ref 6.4–8.2)
RBC # BLD AUTO: 4.91 X10*6/UL (ref 4.5–5.9)
SODIUM SERPL-SCNC: 139 MMOL/L (ref 136–145)
TRIGL SERPL-MCNC: 237 MG/DL (ref 0–149)
VLDL: 47 MG/DL (ref 0–40)
WBC # BLD AUTO: 7.7 X10*3/UL (ref 4.4–11.3)

## 2024-08-28 ENCOUNTER — HOSPITAL ENCOUNTER (OUTPATIENT)
Dept: RADIOLOGY | Facility: CLINIC | Age: 69
Discharge: HOME | End: 2024-08-28
Payer: MEDICARE

## 2024-08-28 DIAGNOSIS — R94.31 ABNORMAL EKG: ICD-10-CM

## 2024-08-28 DIAGNOSIS — Z82.49 FAMILY HISTORY OF CARDIAC PACEMAKER: ICD-10-CM

## 2024-08-28 DIAGNOSIS — M19.90 OSTEOARTHRITIS, UNSPECIFIED OSTEOARTHRITIS TYPE, UNSPECIFIED SITE: ICD-10-CM

## 2024-08-28 DIAGNOSIS — E78.2 MIXED HYPERLIPIDEMIA: ICD-10-CM

## 2024-08-28 DIAGNOSIS — R06.09 DYSPNEA ON EXERTION: ICD-10-CM

## 2024-08-28 DIAGNOSIS — I51.7 ENLARGED RV (RIGHT VENTRICLE): ICD-10-CM

## 2024-08-28 DIAGNOSIS — I51.9 RIGHT VENTRICULAR DYSFUNCTION: ICD-10-CM

## 2024-08-28 DIAGNOSIS — I77.810 ASCENDING AORTA DILATATION (CMS-HCC): ICD-10-CM

## 2024-08-28 DIAGNOSIS — E55.9 VITAMIN D DEFICIENCY: ICD-10-CM

## 2024-08-28 DIAGNOSIS — I10 PRIMARY HYPERTENSION: ICD-10-CM

## 2024-08-28 DIAGNOSIS — E78.5 HYPERLIPIDEMIA, UNSPECIFIED HYPERLIPIDEMIA TYPE: ICD-10-CM

## 2024-08-28 DIAGNOSIS — I51.7 LVH (LEFT VENTRICULAR HYPERTROPHY): ICD-10-CM

## 2024-08-28 DIAGNOSIS — R06.83 SNORING: ICD-10-CM

## 2024-08-28 DIAGNOSIS — R53.83 FATIGUE, UNSPECIFIED TYPE: ICD-10-CM

## 2024-08-28 DIAGNOSIS — E66.09 CLASS 2 OBESITY DUE TO EXCESS CALORIES WITHOUT SERIOUS COMORBIDITY WITH BODY MASS INDEX (BMI) OF 38.0 TO 38.9 IN ADULT: ICD-10-CM

## 2024-08-28 DIAGNOSIS — R93.1 ELEVATED CORONARY ARTERY CALCIUM SCORE: ICD-10-CM

## 2024-08-28 DIAGNOSIS — Z96.643 STATUS POST BILATERAL HIP REPLACEMENTS: ICD-10-CM

## 2024-08-28 DIAGNOSIS — R94.30 ABNORMAL CARDIOVASCULAR FUNCTION STUDY: ICD-10-CM

## 2024-08-28 PROCEDURE — 75561 CARDIAC MRI FOR MORPH W/DYE: CPT | Performed by: INTERNAL MEDICINE

## 2024-08-28 PROCEDURE — 75565 CARD MRI VELOC FLOW MAPPING: CPT | Performed by: INTERNAL MEDICINE

## 2024-08-28 PROCEDURE — 75565 CARD MRI VELOC FLOW MAPPING: CPT

## 2024-08-28 PROCEDURE — A9575 INJ GADOTERATE MEGLUMI 0.1ML: HCPCS | Performed by: INTERNAL MEDICINE

## 2024-08-28 PROCEDURE — 2550000001 HC RX 255 CONTRASTS: Performed by: INTERNAL MEDICINE

## 2024-08-28 PROCEDURE — 75561 CARDIAC MRI FOR MORPH W/DYE: CPT

## 2024-08-28 RX ORDER — GADOTERATE MEGLUMINE 376.9 MG/ML
40 INJECTION INTRAVENOUS
Status: COMPLETED | OUTPATIENT
Start: 2024-08-28 | End: 2024-08-28

## 2024-09-05 ENCOUNTER — APPOINTMENT (OUTPATIENT)
Dept: CARDIOLOGY | Facility: CLINIC | Age: 69
End: 2024-09-05
Payer: MEDICARE

## 2024-09-05 VITALS
BODY MASS INDEX: 38.94 KG/M2 | WEIGHT: 272 LBS | DIASTOLIC BLOOD PRESSURE: 82 MMHG | OXYGEN SATURATION: 93 % | HEIGHT: 70 IN | HEART RATE: 93 BPM | SYSTOLIC BLOOD PRESSURE: 140 MMHG

## 2024-09-05 DIAGNOSIS — I10 PRIMARY HYPERTENSION: ICD-10-CM

## 2024-09-05 DIAGNOSIS — Z82.49 FAMILY HISTORY OF CARDIAC PACEMAKER: ICD-10-CM

## 2024-09-05 DIAGNOSIS — M19.90 OSTEOARTHRITIS, UNSPECIFIED OSTEOARTHRITIS TYPE, UNSPECIFIED SITE: ICD-10-CM

## 2024-09-05 DIAGNOSIS — R94.30 ABNORMAL CARDIOVASCULAR FUNCTION STUDY: ICD-10-CM

## 2024-09-05 DIAGNOSIS — R06.83 SNORING: ICD-10-CM

## 2024-09-05 DIAGNOSIS — E55.9 VITAMIN D DEFICIENCY: ICD-10-CM

## 2024-09-05 DIAGNOSIS — R93.1 ELEVATED CORONARY ARTERY CALCIUM SCORE: ICD-10-CM

## 2024-09-05 DIAGNOSIS — E78.2 MIXED HYPERLIPIDEMIA: ICD-10-CM

## 2024-09-05 DIAGNOSIS — R73.9 HYPERGLYCEMIA: Primary | ICD-10-CM

## 2024-09-05 DIAGNOSIS — E78.5 HYPERLIPIDEMIA, UNSPECIFIED HYPERLIPIDEMIA TYPE: ICD-10-CM

## 2024-09-05 DIAGNOSIS — R06.09 DYSPNEA ON EXERTION: ICD-10-CM

## 2024-09-05 DIAGNOSIS — E66.09 CLASS 2 OBESITY DUE TO EXCESS CALORIES WITHOUT SERIOUS COMORBIDITY WITH BODY MASS INDEX (BMI) OF 38.0 TO 38.9 IN ADULT: ICD-10-CM

## 2024-09-05 DIAGNOSIS — R94.31 ABNORMAL EKG: ICD-10-CM

## 2024-09-05 DIAGNOSIS — Z96.643 STATUS POST BILATERAL HIP REPLACEMENTS: ICD-10-CM

## 2024-09-05 DIAGNOSIS — I77.810 ASCENDING AORTA DILATATION (CMS-HCC): ICD-10-CM

## 2024-09-05 DIAGNOSIS — R53.83 FATIGUE, UNSPECIFIED TYPE: ICD-10-CM

## 2024-09-05 DIAGNOSIS — I51.7 LVH (LEFT VENTRICULAR HYPERTROPHY): ICD-10-CM

## 2024-09-05 PROCEDURE — 1157F ADVNC CARE PLAN IN RCRD: CPT | Performed by: INTERNAL MEDICINE

## 2024-09-05 PROCEDURE — 1159F MED LIST DOCD IN RCRD: CPT | Performed by: INTERNAL MEDICINE

## 2024-09-05 PROCEDURE — 3008F BODY MASS INDEX DOCD: CPT | Performed by: INTERNAL MEDICINE

## 2024-09-05 PROCEDURE — 99214 OFFICE O/P EST MOD 30 MIN: CPT | Performed by: INTERNAL MEDICINE

## 2024-09-05 PROCEDURE — 1036F TOBACCO NON-USER: CPT | Performed by: INTERNAL MEDICINE

## 2024-09-05 PROCEDURE — 3079F DIAST BP 80-89 MM HG: CPT | Performed by: INTERNAL MEDICINE

## 2024-09-05 PROCEDURE — 3077F SYST BP >= 140 MM HG: CPT | Performed by: INTERNAL MEDICINE

## 2024-09-05 RX ORDER — LOVASTATIN 40 MG/1
40 TABLET ORAL
Qty: 90 TABLET | Refills: 3 | Status: SHIPPED | OUTPATIENT
Start: 2024-09-05 | End: 2025-09-05

## 2024-09-05 RX ORDER — ASPIRIN 81 MG/1
81 TABLET ORAL DAILY
Qty: 90 TABLET | Refills: 3 | Status: SHIPPED | OUTPATIENT
Start: 2024-09-05 | End: 2025-09-05

## 2024-09-05 RX ORDER — LOSARTAN POTASSIUM 100 MG/1
100 TABLET ORAL DAILY
Qty: 90 TABLET | Refills: 3 | Status: SHIPPED | OUTPATIENT
Start: 2024-09-05 | End: 2025-09-05

## 2024-09-05 RX ORDER — LOVASTATIN 40 MG/1
40 TABLET ORAL
Qty: 90 TABLET | Refills: 3 | Status: SHIPPED | OUTPATIENT
Start: 2024-09-05 | End: 2024-09-05 | Stop reason: DRUGHIGH

## 2024-09-05 RX ORDER — METOPROLOL SUCCINATE 25 MG/1
25 TABLET, EXTENDED RELEASE ORAL DAILY
Qty: 90 TABLET | Refills: 3 | Status: SHIPPED | OUTPATIENT
Start: 2024-09-05 | End: 2025-09-05

## 2024-09-05 RX ORDER — FERROUS SULFATE, DRIED 160(50) MG
1 TABLET, EXTENDED RELEASE ORAL DAILY
COMMUNITY

## 2024-09-05 NOTE — PROGRESS NOTES
CARDIOLOGY OFFICE NOTE     Date:   9/5/2024    Patient:    Chris Wen Jr.    YOB: 1955    Primary Physician: Janak Nelson MD       Reason for Visit: Cardiology follow-up 2 months.    HPI:     Chris Wen Jr. was seen in cardiac evaluation at the  Cardiology office September 5, 2024.      The patients problems are listed as in the impression below.    Electronic medical records reviewed.    Patient returns.  He feels well overall.  His previous testing had suggested RV enlargement hypokinesia.  Cardiac MRI was performed which shows normal RV size and function.  He was reassured.    He continues to be active.  He still has some snoring but only if he lays on his back.  He is not interested in sleep apnea assessment.    He has no changes otherwise in his symptomatology.    Patient denies Chest Pain, TIA or CVA symptoms.  No CHF or Edema.  No Palpitations.  No GI,  or Bleeding Issues. No Recent Fever or Chills.     Cardiovascular and general review of systems is otherwise negative.    A 14-system review is otherwise negative, other than noted.     PHYSICAL EXAMINATION:      Vitals:    09/05/24 1053   BP: 140/82   Pulse: 93   SpO2: 93%     General: No acute distress.  Alert and oriented.  Head And Neck Examination: No jugular venous distention, no carotid bruits, no mass. Carotid upstrokes preserved. Oral mucosa moist.  No xanthelasma. Head and neck examination otherwise unremarkable.  Lungs: Clear to auscultation and percussion. No wheezes, no rales,  and no rhonchi.  Chest: Excursion appeared to be normal. No chest wall tenderness on palpation.  Heart: Normal S1 and S2. No S3. No S4. No rub. Grade 1/6 systolic murmur, best heard at the left sternal border. Point of maximal impulse was within normal limits.  Abdomen: Soft. Nontender. No organomegaly. No bruits. No masses. Obese.  Extremities: No bipedal edema. No clubbing. No cyanosis.  Pulses are strong throughout. No  bruits.  Musculoskeletal Exam: No ulcers, otherwise unremarkable.  Neuro: Neurologically appeared grossly intact.  .  IMPRESSION:       Cardiovascular status stable  Fatigue  Dyspnea on exertion  Lightheadedness  Snoring, witnessed  Hypertension  Hyperlipidemia  Hyperglycemia  Abnormal electrocardiogram  Coronary artery disease, positive CT calcium score, 73, 5/2024  Negative Lexiscan Myoview perfusion stress test, LVEF 65%, 5/2024.  Normal LV systolic function, LVEF 60%, echocardiogram 5/2024  Left ventricular hypertrophy, asymmetric, 1.4 cm by echocardiogram 5/2024  Left ventricular diastolic dysfunction  Right ventricular enlargement and hypokinesia, echocardiogram 5/2024, normal by cardiac MR, 8/2024.  GERD, reported but patient denies  Degenerative joint disease, post prior bilateral total hip replacement  Obesity  Vitamin D deficiency  Family history of pacemaker  Otherwise as per assessment below.    RECOMMENDATIONS:      Patient continues to do well overall.  Would suggest continuing overall.  Will add Toprol-XL 25 mg daily for better hypertensive with heart rate control and given his left ventricular hypertrophy.  His sugars are slightly elevated on his laboratory studies today and therefore we will get a hemoglobin A1c to evaluate possible his underlying diabetes.    Exercise dietary program was encouraged.  Hydration.    [employer] portal use was encouraged.    We will plan to see back in 3 months with Laboratory Studies and ECG as ordered.     Patient will follow up with their primary physician for general care.    The patient knows to contact medical care earlier if need be.      ALLERGIES:     No Known Allergies     MEDICATIONS:     Current Outpatient Medications   Medication Instructions    ascorbic acid (Vitamin C) 500 mg chewable tablet oral    aspirin 81 mg, oral, Daily    calcium carbonate-vitamin D3 500 mg-5 mcg (200 unit) tablet 1 tablet, oral, Daily    losartan (COZAAR) 100 mg, oral, Daily     lovastatin (MEVACOR) 40 mg, oral, Daily RT    multivitamin tablet oral       ELECTROCARDIOGRAM:      None this visit    CARDIAC TESTING:      Cardiac MR, 8/2024:  LV size and function, LVEF 69%.  Normal RV size and function, RVEF 56%.  No significant valvular heart disease.      LABORATORY DATA:      CBC:   Lab Results   Component Value Date    WBC 7.7 07/09/2024    RBC 4.91 07/09/2024    HGB 14.7 07/09/2024    HCT 47.9 07/09/2024     07/09/2024        CMP:    Lab Results   Component Value Date     07/09/2024    K 4.3 07/09/2024     07/09/2024    CO2 30 07/09/2024    BUN 16 07/09/2024    CREATININE 0.94 07/09/2024    GLUCOSE 104 (H) 07/09/2024    CALCIUM 9.0 07/09/2024       Magnesium:    Lab Results   Component Value Date    MG 2.12 07/09/2024       Lipid Profile:    Lab Results   Component Value Date    CHOL 164 07/09/2024    TRIG 237 (H) 07/09/2024    HDL 50.7 07/09/2024    LDLF 82 12/29/2022       Hepatic Function Panel:    Lab Results   Component Value Date    ALKPHOS 48 07/09/2024    ALT 29 07/09/2024    AST 24 07/09/2024    PROT 6.8 07/09/2024    BILITOT 0.6 07/09/2024    BILIDIR 0.1 07/09/2024       TSH:    Lab Results   Component Value Date    TSH 3.04 03/11/2024         DIAGNOSTIC.:     MR cardiac morphology and function w and wo IV contrast  8/30/2024  FINDINGS:   CARDIAC CHAMBERS Normal atrioventricular and ventriculoarterial concordance     LEFT ATRIUM Normal size (Area-20 cm2)   RIGHT ATRIUM Normal size (Area-19 cm2)     INTERATRIAL SEPTUM Intact.     LEFT VENTRICLE The left ventricle is normal in size and shape, and has normal global systolic function. There are no segmental wall motion abnormalities. Quantitative left ventricular functional values are as follows: EDV = 115 cc; EDVi = 49 cc/m2 ESV = 36 cc; ESVi = 16 cc/m2 Stroke volume = 79 cc; SVi = 34 cc/m2 LVEF = 69 % Absolute Cardiac Output = 6.0 l/min.; COi = 2.57 l/min/m2 LV mass = 156 gm; LVMi = 67 gm/m2   *Last GOMEZ et al.  "Normalized left ventricular systolic and diastolic function by steady state free precession cardiovascular magnetic resonance. J Cardiovasc Magn Reson 2006; 8:417-26.   Delayed enhancement imaging was fair quality. Mediocre nulling of myocardium. Delayed-enhancement imaging reveals uniformly \"nulled\" myocardium, signifying that there has been no prior ischemic myocardial damage.  There is also no definite evidence of interstitial fibrosis to suggest an infiltrative process.     RIGHT VENTRICLE The right ventricle appears normal in size, shape, and has normal qualitative systolic function. No segmental wall motion abnormalities. No abnormal delayed enhancement in the myocardium. RV EF 56%.     INTERVENTRICULAR SEPTUM Intact.     AORTIC VALVE Trileaflet aortic valve without stenosis. There is  trivial aortic regurgitation. Flow quantification through the ascending aorta: Forward volume =57 cc/beat Reverse volume = 4 cc/beat Net forward volume = 53 cc/beat Aortic valve peak velocity 1.25 m/sec     MITRAL VALVE There is  trivial mitral regurgitation.       TRICUSPID VALVE There is qualitative trivial tricuspid regurgitation.   Flow analysis QP 4 liters/minute QS 4 liters/minute QP/QS 1.05     THORACIC AORTA The thoracic aorta appears normal in course, caliber, and contour. There is no evidence for acute aortic pathology. The arch vessel branching pattern is  normal.   All the arch branch vessels appear widely patent in their proximal portions. Aortic root 3.5 cm. Ascending thoracic aorta 3.7 cm.     PULMONARY ARTERIES The central pulmonary arteries appear  normal (MPA-2.5 cm, RPA-2.8 cm, LPA-2.5 cm).   SYSTEMIC AND PULMONARY VEINS Normal systemic venous and pulmonary venous return. The SVC and IVC are of normal caliber. Normal pulmonary venous anatomy.       1. Normal left ventricular cavity size and myocardial wall thickness with preserved systolic function. Ejection fraction 69%.   2. Delayed enhancement imaging is " normal. No evidence of fibrosis, infiltrative disease, previous myocardial infarction, or inflammation of the left ventricular or right ventricular myocardium.   3. Normal right ventricular cavity size with with preserved systolic function. RV EF 56%. No CMR evidence of ARVC.         PROBLEM LIST:     Patient Active Problem List   Diagnosis    Allergic contact dermatitis due to plants, except food    Arthritis of left hip    Chronic cough    Contact dermatitis    GERD (gastroesophageal reflux disease)    Hyperlipidemia    Hypertension    Vitamin D deficiency    Rash    Stye    Nephropathy hypertensive    Class 2 obesity due to excess calories with body mass index (BMI) of 38.0 to 38.9 in adult    Medicare annual wellness visit, subsequent    Class 3 severe obesity with body mass index (BMI) of 40.0 to 44.9 in adult (Multi)    Dyspnea on exertion    Conductive hearing loss, bilateral    Disorder of bursae of shoulder region    Diverticulosis of large intestine    Greater trochanteric pain syndrome    History of colonic polyps    History of repair of hip joint    History of substance dependence (Multi)    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)    Pain in joint involving pelvic region and thigh    Presence of right artificial hip joint    Primary localized osteoarthritis of pelvic region and thigh    Osteoarthritis    Status post bilateral hip replacements    Snoring    Family history of cardiac pacemaker    Abnormal EKG    Fatigue    Abnormal cardiovascular function study    Right ventricular dysfunction    Elevated coronary artery calcium score    LVH (left ventricular hypertrophy)    Ascending aorta dilatation (CMS-HCC)    Diastolic dysfunction             Mal Bravo MD, FACC   [facility] / NO /  Cardiology      Of Note:  Smart[employer] system voice recognition dictation software was utilized partially in the preparation of this note, therefore, inaccuracies in spelling, word choice and punctuation may have  occurred which were not recognized the time of signing.    Patient was seen and examined with total time of visit including chart preparation, rooming, and chart completion exceeding 40 minutes.      ----

## 2024-10-07 DIAGNOSIS — E78.5 HYPERLIPIDEMIA, UNSPECIFIED HYPERLIPIDEMIA TYPE: ICD-10-CM

## 2024-10-07 DIAGNOSIS — I10 PRIMARY HYPERTENSION: ICD-10-CM

## 2024-10-07 RX ORDER — LOSARTAN POTASSIUM 100 MG/1
100 TABLET ORAL DAILY
Qty: 90 TABLET | Refills: 3 | Status: SHIPPED | OUTPATIENT
Start: 2024-10-07

## 2024-10-07 RX ORDER — LOVASTATIN 40 MG/1
TABLET ORAL
Qty: 90 TABLET | Refills: 3 | Status: SHIPPED | OUTPATIENT
Start: 2024-10-07

## 2024-11-21 ENCOUNTER — APPOINTMENT (OUTPATIENT)
Dept: PRIMARY CARE | Facility: CLINIC | Age: 69
End: 2024-11-21
Payer: MEDICARE

## 2024-11-21 VITALS
BODY MASS INDEX: 39.08 KG/M2 | RESPIRATION RATE: 14 BRPM | SYSTOLIC BLOOD PRESSURE: 130 MMHG | HEART RATE: 97 BPM | DIASTOLIC BLOOD PRESSURE: 76 MMHG | OXYGEN SATURATION: 98 % | WEIGHT: 273 LBS | HEIGHT: 70 IN

## 2024-11-21 DIAGNOSIS — E55.9 VITAMIN D DEFICIENCY: ICD-10-CM

## 2024-11-21 DIAGNOSIS — L23.7 POISON IVY DERMATITIS: ICD-10-CM

## 2024-11-21 DIAGNOSIS — Z47.1 AFTERCARE FOLLOWING BILATERAL HIP JOINT REPLACEMENT SURGERY: ICD-10-CM

## 2024-11-21 DIAGNOSIS — Z96.643 AFTERCARE FOLLOWING BILATERAL HIP JOINT REPLACEMENT SURGERY: ICD-10-CM

## 2024-11-21 DIAGNOSIS — Z00.00 MEDICARE ANNUAL WELLNESS VISIT, SUBSEQUENT: Primary | ICD-10-CM

## 2024-11-21 PROCEDURE — 1170F FXNL STATUS ASSESSED: CPT | Performed by: INTERNAL MEDICINE

## 2024-11-21 PROCEDURE — 1157F ADVNC CARE PLAN IN RCRD: CPT | Performed by: INTERNAL MEDICINE

## 2024-11-21 PROCEDURE — 3075F SYST BP GE 130 - 139MM HG: CPT | Performed by: INTERNAL MEDICINE

## 2024-11-21 PROCEDURE — 1124F ACP DISCUSS-NO DSCNMKR DOCD: CPT | Performed by: INTERNAL MEDICINE

## 2024-11-21 PROCEDURE — 3078F DIAST BP <80 MM HG: CPT | Performed by: INTERNAL MEDICINE

## 2024-11-21 PROCEDURE — G0439 PPPS, SUBSEQ VISIT: HCPCS | Performed by: INTERNAL MEDICINE

## 2024-11-21 PROCEDURE — 1159F MED LIST DOCD IN RCRD: CPT | Performed by: INTERNAL MEDICINE

## 2024-11-21 PROCEDURE — 99212 OFFICE O/P EST SF 10 MIN: CPT | Performed by: INTERNAL MEDICINE

## 2024-11-21 PROCEDURE — 3008F BODY MASS INDEX DOCD: CPT | Performed by: INTERNAL MEDICINE

## 2024-11-21 RX ORDER — AMOXICILLIN 250 MG/1
2000 CAPSULE ORAL DAILY PRN
Qty: 8 CAPSULE | Refills: 11 | Status: SHIPPED | OUTPATIENT
Start: 2024-11-21

## 2024-11-21 RX ORDER — ACETAMINOPHEN 500 MG
2000 TABLET ORAL DAILY
Start: 2024-11-21

## 2024-11-21 RX ORDER — TRIAMCINOLONE ACETONIDE 1 MG/G
CREAM TOPICAL 2 TIMES DAILY
Qty: 80 G | Refills: 0 | Status: SHIPPED | OUTPATIENT
Start: 2024-11-21

## 2024-11-21 RX ORDER — PREDNISONE 20 MG/1
TABLET ORAL
Qty: 18 TABLET | Refills: 0 | Status: SHIPPED | OUTPATIENT
Start: 2024-11-21 | End: 2024-11-30

## 2024-11-21 ASSESSMENT — PATIENT HEALTH QUESTIONNAIRE - PHQ9: 2. FEELING DOWN, DEPRESSED OR HOPELESS: NOT AT ALL

## 2024-11-21 ASSESSMENT — ACTIVITIES OF DAILY LIVING (ADL)
DOING_HOUSEWORK: INDEPENDENT
MANAGING_FINANCES: INDEPENDENT
BATHING: INDEPENDENT
GROCERY_SHOPPING: INDEPENDENT
DRESSING: INDEPENDENT
TAKING_MEDICATION: INDEPENDENT

## 2024-11-21 NOTE — ASSESSMENT & PLAN NOTE
Orders:    cholecalciferol (Vitamin D3) 50 mcg (2,000 unit) capsule; Take 1 capsule (50 mcg) by mouth once daily.

## 2024-11-21 NOTE — PROGRESS NOTES
"Subjective   Reason for Visit: Chris Wen Jr. is an 69 y.o. male here for a Medicare Wellness visit.   GETTING FLU SHOT AT PHARM WITH WIFE               HPI  SEE DR. DOWNS PUT ON METOPROLOL    HAS BEEN TAKING AMOXIL PROPHYLACTICALLY FOR JOINT REPLACEMENT SURGERIES    STARTED D3 SUPPLEMENT    NEVER SMOKER    GET FREQUENT SEVERE POISON IVY DERMATITIS VERY ALLERGIC  Patient Care Team:  Janak Nelson MD as PCP - General (Internal Medicine)  aJnak Nelson MD as PCP - [employer] PCP  Mal Bravo MD as Cardiologist (Cardiology)     Review of Systems   Constitutional:  Negative for chills, diaphoresis, fatigue and fever.   Respiratory:  Negative for cough, chest tightness, shortness of breath and wheezing.    Cardiovascular:  Negative for chest pain, palpitations and leg swelling.   Gastrointestinal:  Negative for constipation, diarrhea, nausea and vomiting.   Musculoskeletal:  Negative for joint swelling and myalgias.       Objective   Vitals:  /76   Pulse 97   Resp 14   Ht 1.778 m (5' 10\")   Wt 124 kg (273 lb)   SpO2 98%   BMI 39.17 kg/m²       Physical Exam  Vitals reviewed.   Constitutional:       General: He is not in acute distress.     Appearance: He is obese. He is not ill-appearing.   HENT:      Right Ear: Tympanic membrane, ear canal and external ear normal. There is no impacted cerumen.      Left Ear: Tympanic membrane, ear canal and external ear normal. There is no impacted cerumen.   Cardiovascular:      Rate and Rhythm: Normal rate and regular rhythm.      Pulses: Normal pulses.      Heart sounds:      No gallop.   Pulmonary:      Breath sounds: Normal breath sounds. No wheezing, rhonchi or rales.   Abdominal:      General: Abdomen is flat. Bowel sounds are normal.      Palpations: Abdomen is soft.      Tenderness: There is no guarding or rebound.   Musculoskeletal:      Right lower leg: No edema.      Left lower leg: No edema. "         Assessment & Plan  Poison ivy dermatitis    Orders:    predniSONE (Deltasone) 20 mg tablet; Take 3 tabs (60mg) daily for 3 days, then take 2 tabs (40mg) daily for 3 days, then take 1 tab (20mg) daily for 3 days.    triamcinolone (Kenalog) 0.1 % cream; Apply topically 2 times a day. Apply to affected area 1-2 times daily as needed. Avoid face and groin.    Aftercare following bilateral hip joint replacement surgery    Orders:    amoxicillin (Amoxil) 250 mg capsule; Take 8 capsules (2,000 mg) by mouth once daily as needed ([employer]).    Vitamin D deficiency    Orders:    cholecalciferol (Vitamin D3) 50 mcg (2,000 unit) capsule; Take 1 capsule (50 mcg) by mouth once daily.    Medicare annual wellness visit, subsequent            Patient Instructions    PLEASE VERIFY THAT PNEUMONIA IMMUNIZATION HAS BEEN RECEIVED BY YOU - PREVNAR-20 IS THE RECOMMENDED ONE AND IT SHOULD BE RECEIVED EVERY 7-10 YEARS    2.  YOU RECEIVED TETANUS/PERTUSSIS (BOOSTRIX) IMMUNIZATION IN 2022 WHICH SHOULD BE GOOD FOR THE UPCOMING NEW BABY    3.  NEW DIET/EXERCISE FOR WEIGHT LOSS.  MEDICATIONS CAN BE CONSIDERED IF WEIGHT LOSS EFFORTS ARE UNSUCCESSFUL    4.  REGULAR EYE EXAMS AS YOU ARE DOING    5.  YOU ARE OTHERWISE UP TO DATE FROM A HEALTH SCREENING AND MAINTENANCE STANDPOINT FOR MEDICARE    6.  AMOXICILLIN PREVENTATIVE ANTIBIOTICS ARE SENT IN FOR YOU, AS IS A SUPPLY OF PREDNISONE TAPER AND TRIAMCINOLONE CREAM FOR AS NEEDED FOR SEVERE RECURRENT POISON IVY DERMATITIS.    7.  FOLLOW UP 6 MONTHS FOR WEIGH IN SEE HOW YOU ARE DOING

## 2024-11-21 NOTE — PATIENT INSTRUCTIONS
PLEASE VERIFY THAT PNEUMONIA IMMUNIZATION HAS BEEN RECEIVED BY YOU - PREVNAR-20 IS THE RECOMMENDED ONE AND IT SHOULD BE RECEIVED EVERY 7-10 YEARS    2.  YOU RECEIVED TETANUS/PERTUSSIS (BOOSTRIX) IMMUNIZATION IN 2022 WHICH SHOULD BE GOOD FOR THE UPCOMING NEW BABY    3.  NEW DIET/EXERCISE FOR WEIGHT LOSS.  MEDICATIONS CAN BE CONSIDERED IF WEIGHT LOSS EFFORTS ARE UNSUCCESSFUL    4.  REGULAR EYE EXAMS AS YOU ARE DOING    5.  YOU ARE OTHERWISE UP TO DATE FROM A HEALTH SCREENING AND MAINTENANCE STANDPOINT FOR MEDICARE    6.  AMOXICILLIN PREVENTATIVE ANTIBIOTICS ARE SENT IN FOR YOU, AS IS A SUPPLY OF PREDNISONE TAPER AND TRIAMCINOLONE CREAM FOR AS NEEDED FOR SEVERE RECURRENT POISON IVY DERMATITIS.    7.  FOLLOW UP 6 MONTHS FOR WEIGH IN SEE HOW YOU ARE DOING

## 2024-11-25 PROBLEM — F19.21 HISTORY OF SUBSTANCE DEPENDENCE (MULTI): Status: RESOLVED | Noted: 2023-06-20 | Resolved: 2024-11-25

## 2024-11-25 ASSESSMENT — ENCOUNTER SYMPTOMS
NAUSEA: 0
DIAPHORESIS: 0
FATIGUE: 0
VOMITING: 0
CHILLS: 0
PALPITATIONS: 0
COUGH: 0
FEVER: 0
JOINT SWELLING: 0
SHORTNESS OF BREATH: 0
CONSTIPATION: 0
CHEST TIGHTNESS: 0
DIARRHEA: 0
MYALGIAS: 0
WHEEZING: 0

## 2024-12-05 ENCOUNTER — APPOINTMENT (OUTPATIENT)
Dept: CARDIOLOGY | Facility: CLINIC | Age: 69
End: 2024-12-05
Payer: MEDICARE

## 2024-12-09 ENCOUNTER — APPOINTMENT (OUTPATIENT)
Dept: CARDIOLOGY | Facility: CLINIC | Age: 69
End: 2024-12-09
Payer: MEDICARE

## 2024-12-09 VITALS
SYSTOLIC BLOOD PRESSURE: 122 MMHG | DIASTOLIC BLOOD PRESSURE: 84 MMHG | HEIGHT: 70 IN | BODY MASS INDEX: 41.32 KG/M2 | HEART RATE: 98 BPM | WEIGHT: 288.6 LBS

## 2024-12-09 DIAGNOSIS — R53.83 FATIGUE, UNSPECIFIED TYPE: ICD-10-CM

## 2024-12-09 DIAGNOSIS — E78.2 MIXED HYPERLIPIDEMIA: ICD-10-CM

## 2024-12-09 DIAGNOSIS — R06.83 SNORING: ICD-10-CM

## 2024-12-09 DIAGNOSIS — Z82.49 FAMILY HISTORY OF CARDIAC PACEMAKER: ICD-10-CM

## 2024-12-09 DIAGNOSIS — R93.1 ELEVATED CORONARY ARTERY CALCIUM SCORE: ICD-10-CM

## 2024-12-09 DIAGNOSIS — I51.7 LVH (LEFT VENTRICULAR HYPERTROPHY): ICD-10-CM

## 2024-12-09 DIAGNOSIS — I10 PRIMARY HYPERTENSION: ICD-10-CM

## 2024-12-09 DIAGNOSIS — E78.5 HYPERLIPIDEMIA, UNSPECIFIED HYPERLIPIDEMIA TYPE: ICD-10-CM

## 2024-12-09 DIAGNOSIS — R06.09 DYSPNEA ON EXERTION: ICD-10-CM

## 2024-12-09 DIAGNOSIS — R94.31 ABNORMAL EKG: ICD-10-CM

## 2024-12-09 DIAGNOSIS — R94.30 ABNORMAL CARDIOVASCULAR FUNCTION STUDY: ICD-10-CM

## 2024-12-09 DIAGNOSIS — Z96.643 STATUS POST BILATERAL HIP REPLACEMENTS: ICD-10-CM

## 2024-12-09 DIAGNOSIS — E55.9 VITAMIN D DEFICIENCY: ICD-10-CM

## 2024-12-09 DIAGNOSIS — M19.90 OSTEOARTHRITIS, UNSPECIFIED OSTEOARTHRITIS TYPE, UNSPECIFIED SITE: ICD-10-CM

## 2024-12-09 DIAGNOSIS — E66.812 CLASS 2 OBESITY DUE TO EXCESS CALORIES WITHOUT SERIOUS COMORBIDITY WITH BODY MASS INDEX (BMI) OF 38.0 TO 38.9 IN ADULT: ICD-10-CM

## 2024-12-09 DIAGNOSIS — E66.09 CLASS 2 OBESITY DUE TO EXCESS CALORIES WITHOUT SERIOUS COMORBIDITY WITH BODY MASS INDEX (BMI) OF 38.0 TO 38.9 IN ADULT: ICD-10-CM

## 2024-12-09 DIAGNOSIS — I77.810 ASCENDING AORTA DILATATION (CMS-HCC): ICD-10-CM

## 2024-12-09 DIAGNOSIS — R73.9 HYPERGLYCEMIA: ICD-10-CM

## 2024-12-09 PROCEDURE — 3074F SYST BP LT 130 MM HG: CPT | Performed by: INTERNAL MEDICINE

## 2024-12-09 PROCEDURE — 1159F MED LIST DOCD IN RCRD: CPT | Performed by: INTERNAL MEDICINE

## 2024-12-09 PROCEDURE — 3008F BODY MASS INDEX DOCD: CPT | Performed by: INTERNAL MEDICINE

## 2024-12-09 PROCEDURE — 1036F TOBACCO NON-USER: CPT | Performed by: INTERNAL MEDICINE

## 2024-12-09 PROCEDURE — 1157F ADVNC CARE PLAN IN RCRD: CPT | Performed by: INTERNAL MEDICINE

## 2024-12-09 PROCEDURE — 99214 OFFICE O/P EST MOD 30 MIN: CPT | Performed by: INTERNAL MEDICINE

## 2024-12-09 PROCEDURE — 3079F DIAST BP 80-89 MM HG: CPT | Performed by: INTERNAL MEDICINE

## 2024-12-09 RX ORDER — METOPROLOL SUCCINATE 25 MG/1
25 TABLET, EXTENDED RELEASE ORAL DAILY
Qty: 90 TABLET | Refills: 3 | Status: SHIPPED | OUTPATIENT
Start: 2024-12-09 | End: 2025-12-09

## 2024-12-09 NOTE — PATIENT INSTRUCTIONS
FASTING LABS, FASTING FROM MIDNIGHT THE NIGHT BEFORE TO BE DONE 1 WEEK PRIOR TO YOUR APPOINTMENT WITH DR RAMACHANDRAN IN OCTOBER    PER DR RAMACHANDRAN TALK TO DR CAM REGARDING SEMAGLUTIDES (GLP1) FOR DIABETES/WEIGHT LOSS    DID YOU KNOW  We have a pharmacy here in the [facility].  They can fill all prescriptions, not just cardiac medications.  Prescriptions from other pharmacies can easily be transferred to the  pharmacy by the  pharmacist on site.   pharmacies offer FREE HOME DELIVERY on medications to anywhere in [place]. They can sync your medications. Typically prescriptions can be ready in 10 - 15 minutes. If pharmacy is unable to fill your  prescription or if cost is more than your paying now the Pharmacist can easily transfer back to your Pharmacy of choice. Pharmacy phone # [phone].     Please bring all medicines, vitamins, and herbal supplements with you in original bottles to every appointment! This is the best way to ensure your medication list in your chart is accurate.    Prescriptions will not be filled unless you are compliant with your follow up appointments or have a follow up appointment scheduled as per instruction of your physician. Refills should be requested at the time of your visit.

## 2024-12-09 NOTE — PROGRESS NOTES
CARDIOLOGY OFFICE NOTE     Date:   12/9/2024    Patient:    Chris Wen Jr.    YOB: 1955    Primary Physician: Janak Nelson MD       Reason for Visit: 3-month cardiology follow-up.    HPI:     Chris Wen Jr. was seen in cardiac evaluation at the  Cardiology office December 9, 2024.      The patients problems are listed as in the impression below.    Electronic medical records reviewed.    Patient returns.  He states that he feels better.  He started Nutrisystem diet.  He lost 8 pounds.  He feels somewhat better.  He is tolerating his Toprol well.    He has no new complaints.    Patient denies Chest Pain, SOB, Lightheadedness, Dizziness, TIA or CVA symptoms.  No CHF or Edema.  No Palpitations.  No GI,  or Bleeding Issues. No Recent Fever or Chills.     Cardiovascular and general review of systems is otherwise negative.    A 14-system review is otherwise negative, other than noted.     PHYSICAL EXAMINATION:      Vitals:    12/09/24 1512   BP: 122/84   Pulse: 98     General: No acute distress.  Alert and oriented.  Head And Neck Examination: No jugular venous distention, no carotid bruits, no mass. Carotid upstrokes preserved. Oral mucosa moist.  No xanthelasma. Head and neck examination otherwise unremarkable.  Lungs: Clear to auscultation and percussion. No wheezes, no rales,  and no rhonchi.  Chest: Excursion appeared to be normal. No chest wall tenderness on palpation.  Heart: Normal S1 and S2. No S3. No S4. No rub. Grade 1/6 systolic murmur, best heard at the left sternal border. Point of maximal impulse was within normal limits.  Abdomen: Soft. Nontender. No organomegaly. No bruits. No masses. Obese.  Extremities: No bipedal edema. No clubbing. No cyanosis.  Pulses are strong throughout. No bruits.  Musculoskeletal Exam: No ulcers, otherwise unremarkable.  Neuro: Neurologically appeared grossly intact.  .  IMPRESSION:       Cardiovascular status  stable  Fatigue  Dyspnea on exertion  Snoring, witnessed  Hypertension  Hyperlipidemia  Hyperglycemia  Abnormal electrocardiogram  Coronary artery disease, positive CT calcium score, 73, 5/2024  Negative Lexiscan Myoview perfusion stress test, LVEF 65%, 5/2024.  Normal LV systolic function, LVEF 60%, echocardiogram 5/2024  Left ventricular hypertrophy, asymmetric, 1.4 cm by echocardiogram 5/2024  Left ventricular diastolic dysfunction  Right ventricular enlargement and hypokinesia, echocardiogram 5/2024, normal by cardiac MR, 8/2024.  GERD, reported but patient denies  Degenerative joint disease, post prior bilateral total hip replacement  Obesity  Vitamin D deficiency  Family history of pacemaker  Otherwise as per assessment below.    RECOMMENDATIONS:      Patient is doing well overall.  He is working on diet and exercise.  Discussed these veins in detail.  Will continue his current medications.  Refills were provided.    Hydration was encouraged.    [place] use was encouraged.    We will plan to see back in 10 months with Laboratory Studies and ECG as ordered.     Patient will follow up with their primary physician for general care.    The patient knows to contact medical care earlier if need be.      ALLERGIES:     No Known Allergies     MEDICATIONS:     Current Outpatient Medications   Medication Instructions    amoxicillin (AMOXIL) 2,000 mg, oral, Daily PRN    ascorbic acid (Vitamin C) 500 mg chewable tablet Chew.    aspirin 81 mg, oral, Daily    calcium carbonate-vitamin D3 500 mg-5 mcg (200 unit) tablet 1 tablet, oral, Daily    cholecalciferol (VITAMIN D3) 50 mcg, oral, Daily    losartan (COZAAR) 100 mg, oral, Daily    lovastatin (Mevacor) 40 mg tablet TAKE 1 TABLET DAILY IN THE EVENING BEFORE DINNER.    metoprolol succinate XL (TOPROL-XL) 25 mg, oral, Daily, Do not crush or chew.    multivitamin tablet Take by mouth.    triamcinolone (Kenalog) 0.1 % cream Topical, 2 times daily, Apply to affected area  1-2 times daily as needed. Avoid face and groin.       ELECTROCARDIOGRAM:      None this visit    CARDIAC TESTING:      None this visit    LABORATORY DATA:      None this visit.    Prior reviewed with patient.    CBC:   Lab Results   Component Value Date    WBC 7.7 07/09/2024    RBC 4.91 07/09/2024    HGB 14.7 07/09/2024    HCT 47.9 07/09/2024     07/09/2024        CMP:    Lab Results   Component Value Date     07/09/2024    K 4.3 07/09/2024     07/09/2024    CO2 30 07/09/2024    BUN 16 07/09/2024    CREATININE 0.94 07/09/2024    GLUCOSE 104 (H) 07/09/2024    CALCIUM 9.0 07/09/2024       Magnesium:    Lab Results   Component Value Date    MG 2.12 07/09/2024       Lipid Profile:    Lab Results   Component Value Date    CHOL 164 07/09/2024    TRIG 237 (H) 07/09/2024    HDL 50.7 07/09/2024    LDLF 82 12/29/2022       Hepatic Function Panel:    Lab Results   Component Value Date    ALKPHOS 48 07/09/2024    ALT 29 07/09/2024    AST 24 07/09/2024    PROT 6.8 07/09/2024    BILITOT 0.6 07/09/2024    BILIDIR 0.1 07/09/2024       TSH:    Lab Results   Component Value Date    TSH 3.04 03/11/2024     DIAGNOSTIC.:     MR cardiac morphology and function w and wo IV contrast    Result Date: 8/30/2024  Interpreted By:  Navneet August, STUDY: MR CARDIAC RESONANCE IMAGING FOR VELOCITY FLOW MAPPING; MR CARDIAC MORPHOLOGY AND FUNCTION W AND WO IV CONTRAST;  8/28/2024 12:05 pm   INDICATION: Signs/Symptoms:RV; Signs/Symptoms:RVE and RV Hypokinesis..   This study is performed to assess myocardial viability and damage, and to quantitate left ventricular and valvular function.   ,E78.5 Hyperlipidemia, unspecified,R06.09 Other forms of dyspnea,I10 Essential (primary) hypertension,E66.09 Other obesity due to excess calories,Z68.38 Body mass index (BMI) 38.0-38.9, adult,M19.90 Unspecified osteoarthritis, unspecified site,Z96.643 Presence of artificial hip joint, bilateral,R06.83 Snoring,E78.2 Mixed hyperlipidemia,Z82.49  "Family history of ischemic heart disease and other diseases of the circulatory system,E55.9 Vitamin D deficiency, unspecified,R94.31 Abnormal electrocardiogram (ECG) (EKG),R53.83 Other fatigue,R94.30 Abnormal result of cardiovascular function study, unspecified,I51.7 Cardiomegaly,I51.9 Heart disease, unspecified,R93.1 Abnormal findings on diagnostic imaging of heart and coronary circulation,I51.7 Cardiomegaly,I77.810 Thoracic aortic ectasia (CMS-HCC)   COMPARISON: None.   ACCESSION NUMBER(S): [id number]; [id number]   ORDERING CLINICIAN: CLYDE RAMACHANDRAN   TECHNIQUE: Jaime 1.5 Marya MRI scanner. Turbo spin echo and balanced steady state free precession (bSSFP) imaging for anatomic definition.   Flow quantification sequences for hemodynamics. Delayed gadolinium enhancement analysis after injection of Dotarem 40 mL, 0.17mmol/kg   HT-70 inches, 170 cm; WT-260 pounds, 118 kg; BSA-2.33 m2   FINDINGS: CARDIAC CHAMBERS Normal atrioventricular and ventriculoarterial concordance   LEFT ATRIUM Normal size (Area-20 cm2)   RIGHT ATRIUM Normal size (Area-19 cm2)   INTERATRIAL SEPTUM Intact.   LEFT VENTRICLE The left ventricle is normal in size and shape, and has normal global systolic function. There are no segmental wall motion abnormalities. Quantitative left ventricular functional values are as follows: EDV = 115 cc; EDVi = 49 cc/m2 ESV = 36 cc; ESVi = 16 cc/m2 Stroke volume = 79 cc; SVi = 34 cc/m2 LVEF = 69 % Absolute Cardiac Output = 6.0 l/min.; COi = 2.57 l/min/m2 LV mass = 156 gm; LVMi = 67 gm/m2   *Last AM et al. Normalized left ventricular systolic and diastolic function by steady state free precession cardiovascular magnetic resonance. J Cardiovasc Magn Reson 2006; 8:417-26.   Delayed enhancement imaging was fair quality. Mediocre nulling of myocardium. Delayed-enhancement imaging reveals uniformly \"nulled\" myocardium, signifying that there has been no prior ischemic myocardial damage.  There is also no " definite evidence of interstitial fibrosis to suggest an infiltrative process.   RIGHT VENTRICLE The right ventricle appears normal in size, shape, and has normal qualitative systolic function. No segmental wall motion abnormalities. No abnormal delayed enhancement in the myocardium. RV EF 56%.   INTERVENTRICULAR SEPTUM Intact.   AORTIC VALVE Trileaflet aortic valve without stenosis. There is  trivial aortic regurgitation. Flow quantification through the ascending aorta: Forward volume =57 cc/beat Reverse volume = 4 cc/beat Net forward volume = 53 cc/beat Aortic valve peak velocity 1.25 m/sec   MITRAL VALVE There is  trivial mitral regurgitation.     TRICUSPID VALVE There is qualitative trivial tricuspid regurgitation.   Flow analysis QP 4 liters/minute QS 4 liters/minute QP/QS 1.05   THORACIC AORTA The thoracic aorta appears normal in course, caliber, and contour. There is no evidence for acute aortic pathology. The arch vessel branching pattern is  normal.   All the arch branch vessels appear widely patent in their proximal portions. Aortic root 3.5 cm. Ascending thoracic aorta 3.7 cm.   PULMONARY ARTERIES The central pulmonary arteries appear  normal (MPA-2.5 cm, RPA-2.8 cm, LPA-2.5 cm).   SYSTEMIC AND PULMONARY VEINS Normal systemic venous and pulmonary venous return. The SVC and IVC are of normal caliber. Normal pulmonary venous anatomy.   CHEST The chest wall is normal. No significant lymphadenopathy or mass is seen in limited images of the mediastinum. Limited imaging through the lungs reveals no gross abnormalities. No pleural effusion.   UPPER ABDOMEN Multiple irregular hepatic cystic masses largest measuring 1.9cm, and 1.7 cm suboptimally characterized/incompletely evaluated with this study. Consider further evaluation with another modality.       1. Normal left ventricular cavity size and myocardial wall thickness with preserved systolic function. Ejection fraction 69%. 2. Delayed enhancement imaging is  normal. No evidence of fibrosis, infiltrative disease, previous myocardial infarction, or inflammation of the left ventricular or right ventricular myocardium. 3. Normal right ventricular cavity size with with preserved systolic function. RV EF 56%. No CMR evidence of AR[facility].     MACRO: None   Signed by: Navneet August 8/30/2024 11:32 AM Dictation workstation:   [id number]    MR cardiac resonance imaging for velocity flow mapping    Result Date: 8/30/2024  Interpreted By:  Navneet August, STUDY: MR CARDIAC RESONANCE IMAGING FOR VELOCITY FLOW MAPPING; MR CARDIAC MORPHOLOGY AND FUNCTION W AND WO IV CONTRAST;  8/28/2024 12:05 pm   INDICATION: Signs/Symptoms:RV; Signs/Symptoms:RVE and RV Hypokinesis..   This study is performed to assess myocardial viability and damage, and to quantitate left ventricular and valvular function.   ,E78.5 Hyperlipidemia, unspecified,R06.09 Other forms of dyspnea,I10 Essential (primary) hypertension,E66.09 Other obesity due to excess calories,Z68.38 Body mass index (BMI) 38.0-38.9, adult,M19.90 Unspecified osteoarthritis, unspecified site,Z96.643 Presence of artificial hip joint, bilateral,R06.83 Snoring,E78.2 Mixed hyperlipidemia,Z82.49 Family history of ischemic heart disease and other diseases of the circulatory system,E55.9 Vitamin D deficiency, unspecified,R94.31 Abnormal electrocardiogram (ECG) (EKG),R53.83 Other fatigue,R94.30 Abnormal result of cardiovascular function study, unspecified,I51.7 Cardiomegaly,I51.9 Heart disease, unspecified,R93.1 Abnormal findings on diagnostic imaging of heart and coronary circulation,I51.7 Cardiomegaly,I77.810 Thoracic aortic ectasia (CMS-HCC)   COMPARISON: None.   ACCESSION NUMBER(S): [id number]; [id number]   ORDERING CLINICIAN: CLYDE RAMACHANDRAN   TECHNIQUE: Jaime 1.5 Marya MRI scanner. Turbo spin echo and balanced steady state free precession (bSSFP) imaging for anatomic definition.   Flow quantification sequences for hemodynamics. Delayed  "gadolinium enhancement analysis after injection of Dotarem 40 mL, 0.17mmol/kg   HT-70 inches, 170 cm; WT-260 pounds, 118 kg; BSA-2.33 m2   FINDINGS: CARDIAC CHAMBERS Normal atrioventricular and ventriculoarterial concordance   LEFT ATRIUM Normal size (Area-20 cm2)   RIGHT ATRIUM Normal size (Area-19 cm2)   INTERATRIAL SEPTUM Intact.   LEFT VENTRICLE The left ventricle is normal in size and shape, and has normal global systolic function. There are no segmental wall motion abnormalities. Quantitative left ventricular functional values are as follows: EDV = 115 cc; EDVi = 49 cc/m2 ESV = 36 cc; ESVi = 16 cc/m2 Stroke volume = 79 cc; SVi = 34 cc/m2 LVEF = 69 % Absolute Cardiac Output = 6.0 l/min.; COi = 2.57 l/min/m2 LV mass = 156 gm; LVMi = 67 gm/m2   *Last GOMEZ et al. Normalized left ventricular systolic and diastolic function by steady state free precession cardiovascular magnetic resonance. J Cardiovasc Magn Reson 2006; 8:417-26.   Delayed enhancement imaging was fair quality. Mediocre nulling of myocardium. Delayed-enhancement imaging reveals uniformly \"nulled\" myocardium, signifying that there has been no prior ischemic myocardial damage.  There is also no definite evidence of interstitial fibrosis to suggest an infiltrative process.   RIGHT VENTRICLE The right ventricle appears normal in size, shape, and has normal qualitative systolic function. No segmental wall motion abnormalities. No abnormal delayed enhancement in the myocardium. RV EF 56%.   INTERVENTRICULAR SEPTUM Intact.   AORTIC VALVE Trileaflet aortic valve without stenosis. There is  trivial aortic regurgitation. Flow quantification through the ascending aorta: Forward volume =57 cc/beat Reverse volume = 4 cc/beat Net forward volume = 53 cc/beat Aortic valve peak velocity 1.25 m/sec   MITRAL VALVE There is  trivial mitral regurgitation.     TRICUSPID VALVE There is qualitative trivial tricuspid regurgitation.   Flow analysis QP 4 liters/minute QS 4 " liters/minute QP/QS 1.05   THORACIC AORTA The thoracic aorta appears normal in course, caliber, and contour. There is no evidence for acute aortic pathology. The arch vessel branching pattern is  normal.   All the arch branch vessels appear widely patent in their proximal portions. Aortic root 3.5 cm. Ascending thoracic aorta 3.7 cm.   PULMONARY ARTERIES The central pulmonary arteries appear  normal (MPA-2.5 cm, RPA-2.8 cm, LPA-2.5 cm).   SYSTEMIC AND PULMONARY VEINS Normal systemic venous and pulmonary venous return. The SVC and IVC are of normal caliber. Normal pulmonary venous anatomy.   CHEST The chest wall is normal. No significant lymphadenopathy or mass is seen in limited images of the mediastinum. Limited imaging through the lungs reveals no gross abnormalities. No pleural effusion.   UPPER ABDOMEN Multiple irregular hepatic cystic masses largest measuring 1.9cm, and 1.7 cm suboptimally characterized/incompletely evaluated with this study. Consider further evaluation with another modality.       1. Normal left ventricular cavity size and myocardial wall thickness with preserved systolic function. Ejection fraction 69%. 2. Delayed enhancement imaging is normal. No evidence of fibrosis, infiltrative disease, previous myocardial infarction, or inflammation of the left ventricular or right ventricular myocardium. 3. Normal right ventricular cavity size with with preserved systolic function. RV EF 56%. No CMR evidence of [facility].     MACRO: None   Signed by: Navneet August 8/30/2024 11:32 AM Dictation workstation:   [id number]                  PROBLEM LIST:     Patient Active Problem List   Diagnosis    Allergic contact dermatitis due to plants, except food    Arthritis of left hip    Chronic cough    Contact dermatitis    GERD (gastroesophageal reflux disease)    Hyperlipidemia    Hypertension    Vitamin D deficiency    Rash    Stye    Nephropathy hypertensive    Class 2 obesity due to excess calories with body  mass index (BMI) of 38.0 to 38.9 in adult    Medicare annual wellness visit, subsequent    Class 3 severe obesity with body mass index (BMI) of 40.0 to 44.9 in adult    Dyspnea on exertion    Conductive hearing loss, bilateral    Disorder of bursae of shoulder region    Diverticulosis of large intestine    Greater trochanteric pain syndrome    History of colonic polyps    History of repair of hip joint    Pain in joint involving pelvic region and thigh    Presence of right artificial hip joint    Primary localized osteoarthritis of pelvic region and thigh    Osteoarthritis    Status post bilateral hip replacements    Snoring    Family history of cardiac pacemaker    Abnormal EKG    Fatigue    Abnormal cardiovascular function study    Right ventricular dysfunction    Elevated coronary artery calcium score    LVH (left ventricular hypertrophy)    Ascending aorta dilatation (CMS-HCC)    Diastolic dysfunction    Hyperglycemia             Mal Bravo MD, [facility] / [facility] /  Cardiology      Of Note:  [employer] voice recognition dictation software was utilized partially in the preparation of this note, therefore, inaccuracies in spelling, word choice and punctuation may have occurred which were not recognized the time of signing.    Patient was seen and examined with total time of visit including chart preparation, rooming, and chart completion exceeding 40 minutes.      ----

## 2025-05-21 ENCOUNTER — APPOINTMENT (OUTPATIENT)
Dept: PRIMARY CARE | Facility: CLINIC | Age: 70
End: 2025-05-21
Payer: MEDICARE

## 2025-05-29 ENCOUNTER — APPOINTMENT (OUTPATIENT)
Dept: PRIMARY CARE | Facility: CLINIC | Age: 70
End: 2025-05-29
Payer: MEDICARE

## 2025-08-03 ENCOUNTER — OFFICE VISIT (OUTPATIENT)
Dept: URGENT CARE | Age: 70
End: 2025-08-03
Payer: MEDICARE

## 2025-08-03 VITALS
WEIGHT: 280 LBS | SYSTOLIC BLOOD PRESSURE: 118 MMHG | DIASTOLIC BLOOD PRESSURE: 81 MMHG | RESPIRATION RATE: 16 BRPM | HEIGHT: 70 IN | HEART RATE: 98 BPM | BODY MASS INDEX: 40.09 KG/M2 | TEMPERATURE: 98 F | OXYGEN SATURATION: 96 %

## 2025-08-03 DIAGNOSIS — H00.011 HORDEOLUM EXTERNUM OF RIGHT UPPER EYELID: Primary | ICD-10-CM

## 2025-08-03 PROCEDURE — 3008F BODY MASS INDEX DOCD: CPT | Performed by: NURSE PRACTITIONER

## 2025-08-03 PROCEDURE — 99070 SPECIAL SUPPLIES PHYS/QHP: CPT | Performed by: NURSE PRACTITIONER

## 2025-08-03 PROCEDURE — 1125F AMNT PAIN NOTED PAIN PRSNT: CPT | Performed by: NURSE PRACTITIONER

## 2025-08-03 PROCEDURE — 3074F SYST BP LT 130 MM HG: CPT | Performed by: NURSE PRACTITIONER

## 2025-08-03 PROCEDURE — 99203 OFFICE O/P NEW LOW 30 MIN: CPT | Performed by: NURSE PRACTITIONER

## 2025-08-03 PROCEDURE — 3079F DIAST BP 80-89 MM HG: CPT | Performed by: NURSE PRACTITIONER

## 2025-08-03 PROCEDURE — 1157F ADVNC CARE PLAN IN RCRD: CPT | Performed by: NURSE PRACTITIONER

## 2025-08-03 PROCEDURE — 1036F TOBACCO NON-USER: CPT | Performed by: NURSE PRACTITIONER

## 2025-08-03 RX ORDER — ERYTHROMYCIN 5 MG/G
OINTMENT OPHTHALMIC EVERY 6 HOURS
Qty: 3.5 G | Refills: 0 | Status: SHIPPED | OUTPATIENT
Start: 2025-08-03 | End: 2025-08-10

## 2025-08-03 ASSESSMENT — ENCOUNTER SYMPTOMS
PSYCHIATRIC NEGATIVE: 1
EYE PAIN: 1
NEUROLOGICAL NEGATIVE: 1
GASTROINTESTINAL NEGATIVE: 1
CARDIOVASCULAR NEGATIVE: 1
EYE DISCHARGE: 1
RESPIRATORY NEGATIVE: 1
ALLERGIC/IMMUNOLOGIC NEGATIVE: 1
ENDOCRINE NEGATIVE: 1
HEMATOLOGIC/LYMPHATIC NEGATIVE: 1
CONSTITUTIONAL NEGATIVE: 1
MUSCULOSKELETAL NEGATIVE: 1

## 2025-08-03 ASSESSMENT — PAIN SCALES - GENERAL: PAINLEVEL_OUTOF10: 5

## 2025-08-03 NOTE — PATIENT INSTRUCTIONS
-Use eye ointment as directed   -Go to the ER for any change in vision, increasing redness or swelling, or concerning symptoms   -Massage area   -Warm compresses may be created by soaking a cloth towel in hot water and then applied with gentle pressure onto the closed eyelid. They may be applied 4-5 times per day for 10-15 minutes and may be accompanied by gentle massage of the area.

## 2025-08-03 NOTE — PROGRESS NOTES
"Subjective   Patient ID: Chris Wen Jr. is a 69 y.o. male. They present today with a chief complaint of Eye Problem (Right eyelid swelling, pain. Began yesterday).    History of Present Illness  Per patient, right eyelid swelling, pain, and drainage with onset x 1 day. He denies changes in vision, fever, chills, or URI symptoms. He has no other concerns at to address at this time.       Eye Problem  Associated symptoms: discharge        Past Medical History  Allergies as of 08/03/2025    (No Known Allergies)       Prescriptions Prior to Admission[1]     Medical History[2]    Surgical History[3]     reports that he has never smoked. He has never used smokeless tobacco. He reports that he does not currently use alcohol. He reports that he does not currently use drugs.    Review of Systems  Review of Systems   Constitutional: Negative.    HENT: Negative.     Eyes:  Positive for pain and discharge. Negative for visual disturbance.        Eye lid swelling   Respiratory: Negative.     Cardiovascular: Negative.    Gastrointestinal: Negative.    Endocrine: Negative.    Genitourinary: Negative.    Musculoskeletal: Negative.    Skin: Negative.    Allergic/Immunologic: Negative.    Neurological: Negative.    Hematological: Negative.    Psychiatric/Behavioral: Negative.     All other systems reviewed and are negative.                                 Objective    Vitals:    08/03/25 1821   BP: 118/81   Pulse: 98   Resp: 16   Temp: 36.7 °C (98 °F)   TempSrc: Oral   SpO2: 96%   Weight: 127 kg (280 lb)   Height: 1.778 m (5' 10\")     No LMP for male patient.    Physical Exam  Vitals and nursing note reviewed.   Constitutional:       General: He is not in acute distress.     Appearance: Normal appearance. He is not ill-appearing or toxic-appearing.   HENT:      Head: Normocephalic.      Ears:      Comments: Answering all questions appropriately.      Nose: Nose normal.      Mouth/Throat:      Mouth: Mucous membranes are " moist.      Pharynx: Oropharynx is clear.     Eyes:      General:         Right eye: Hordeolum present.      Extraocular Movements: Extraocular movements intact.      Conjunctiva/sclera: Conjunctivae normal.      Pupils: Pupils are equal, round, and reactive to light.      Comments: Upper eye lid swelling with stye.      Cardiovascular:      Rate and Rhythm: Normal rate and regular rhythm.      Pulses: Normal pulses.      Heart sounds: Normal heart sounds.      Comments: 2+P radial pulses   Pulmonary:      Effort: Pulmonary effort is normal.      Comments: Talking in complete sentences with no accessory muscle use.     Musculoskeletal:         General: Normal range of motion.      Cervical back: Normal range of motion.     Skin:     General: Skin is warm and dry.      Capillary Refill: Capillary refill takes less than 2 seconds.     Neurological:      Mental Status: He is alert and oriented to person, place, and time.     Psychiatric:         Mood and Affect: Mood normal.         Behavior: Behavior normal.         Procedures    Point of Care Test & Imaging Results from this visit  No results found for this visit on 08/03/25.   Imaging  No results found.    Cardiology, Vascular, and Other Imaging  No other imaging results found for the past 2 days      Diagnostic study results (if any) were reviewed by NEL Rodriguez.    Assessment/Plan   Allergies, medications, history, and pertinent labs/EKGs/Imaging reviewed by NEL Rodriguez.     Medical Decision Making    Patient presents to the urgent care for right eyelid swelling, pain, and drainage with onset x 1 day. He denies changes in vision, fever, chills, or URI symptoms. Stye noted on right upper eye lid. Will treat for stye. Rx for erythromycin. At time of discharge patient was clinically well-appearing and HDS for outpatient management. The patient was educated regarding diagnosis, supportive care, OTC and Rx medications. The patient was given the  opportunity to ask questions prior to discharge.  They verbalized understanding of my discussion of the plans for treatment, expected course, indications to return to  or seek further evaluation in ED, and the need for timely follow up as directed.        Orders and Diagnoses  Diagnoses and all orders for this visit:  Hordeolum externum of right upper eyelid  -     erythromycin (Romycin) 5 mg/gram (0.5 %) ophthalmic ointment; Apply to right eye every 6 hours for 7 days. Apply Amount per Dose: 0.5 inch (~1 cm) per dose.      Medical Admin Record      Patient disposition: Home    Electronically signed by NEL Rodriguez  7:08 PM           [1] (Not in a hospital admission)   [2]   Past Medical History:  Diagnosis Date    Unilateral primary osteoarthritis, unspecified hip     Hip arthritis   [3]   Past Surgical History:  Procedure Laterality Date    OTHER SURGICAL HISTORY  02/03/2020    Appendectomy    OTHER SURGICAL HISTORY  04/02/2020    Hip replacement    OTHER SURGICAL HISTORY  11/12/2021    Biceps tendon rupture repair

## 2025-08-13 ENCOUNTER — APPOINTMENT (OUTPATIENT)
Dept: PRIMARY CARE | Facility: CLINIC | Age: 70
End: 2025-08-13
Payer: MEDICARE

## 2025-08-13 VITALS
HEART RATE: 98 BPM | OXYGEN SATURATION: 95 % | WEIGHT: 284 LBS | HEIGHT: 70 IN | SYSTOLIC BLOOD PRESSURE: 140 MMHG | RESPIRATION RATE: 14 BRPM | DIASTOLIC BLOOD PRESSURE: 90 MMHG | BODY MASS INDEX: 40.66 KG/M2

## 2025-08-13 DIAGNOSIS — R73.9 HYPERGLYCEMIA: ICD-10-CM
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
## 2025-08-13 DIAGNOSIS — E55.9 MILD VITAMIN D DEFICIENCY: ICD-10-CM

## 2025-08-13 DIAGNOSIS — I10 PRIMARY HYPERTENSION: ICD-10-CM

## 2025-08-13 DIAGNOSIS — E53.8 VITAMIN B12 DEFICIENCY: ICD-10-CM

## 2025-08-13 DIAGNOSIS — L03.311 CELLULITIS OF ABDOMINAL WALL: ICD-10-CM

## 2025-08-13 DIAGNOSIS — E66.813 CLASS 3 SEVERE OBESITY DUE TO EXCESS CALORIES WITHOUT SERIOUS COMORBIDITY WITH BODY MASS INDEX (BMI) OF 40.0 TO 44.9 IN ADULT: ICD-10-CM

## 2025-08-13 DIAGNOSIS — R35.1 NOCTURIA: ICD-10-CM

## 2025-08-13 DIAGNOSIS — E66.01 MORBID (SEVERE) OBESITY DUE TO EXCESS CALORIES (MULTI): ICD-10-CM

## 2025-08-13 RX ORDER — CEPHALEXIN 500 MG/1
500 CAPSULE ORAL 3 TIMES DAILY
Qty: 15 CAPSULE | Refills: 0 | Status: SHIPPED | OUTPATIENT
Start: 2025-08-13 | End: 2025-08-18

## 2025-08-14 ENCOUNTER — APPOINTMENT (OUTPATIENT)
Dept: PRIMARY CARE | Facility: CLINIC | Age: 70
End: 2025-08-14
Payer: MEDICARE
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
## 2025-08-14 ASSESSMENT — ENCOUNTER SYMPTOMS
CHILLS: 0
NAUSEA: 0
PALPITATIONS: 0
CONSTIPATION: 0
FEVER: 0
FATIGUE: 0
COUGH: 0
DIARRHEA: 0
WHEEZING: 0
JOINT SWELLING: 0
MYALGIAS: 0
ROS SKIN COMMENTS: MOLE
SHORTNESS OF BREATH: 0
VOMITING: 0
DIAPHORESIS: 0
CHEST TIGHTNESS: 0

## 2025-10-16 ENCOUNTER — APPOINTMENT (OUTPATIENT)
Dept: CARDIOLOGY | Facility: CLINIC | Age: 70
End: 2025-10-16
Payer: MEDICARE

## 2025-11-17 ENCOUNTER — APPOINTMENT (OUTPATIENT)
Dept: PRIMARY CARE | Facility: CLINIC | Age: 70
End: 2025-11-17
Payer: MEDICARE